# Patient Record
Sex: FEMALE | Race: WHITE | Employment: OTHER | URBAN - NONMETROPOLITAN AREA
[De-identification: names, ages, dates, MRNs, and addresses within clinical notes are randomized per-mention and may not be internally consistent; named-entity substitution may affect disease eponyms.]

---

## 2017-05-17 ENCOUNTER — TELEPHONE (OUTPATIENT)
Dept: FAMILY MEDICINE CLINIC | Age: 82
End: 2017-05-17

## 2017-05-17 DIAGNOSIS — R79.89 ELEVATED D-DIMER: Primary | ICD-10-CM

## 2017-05-17 LAB
BNP INTERPRETATION: NORMAL
D DIMER: 783 NG/ML
PRO-BNP: 98 PG/ML
TROPONIN INTERP: NORMAL
TROPONIN T: <0.03 NG/ML

## 2017-05-23 VITALS
HEIGHT: 64 IN | WEIGHT: 143 LBS | DIASTOLIC BLOOD PRESSURE: 80 MMHG | HEART RATE: 88 BPM | BODY MASS INDEX: 24.41 KG/M2 | SYSTOLIC BLOOD PRESSURE: 124 MMHG

## 2017-05-23 DIAGNOSIS — I10 UNSPECIFIED ESSENTIAL HYPERTENSION: ICD-10-CM

## 2017-05-23 DIAGNOSIS — F41.9 ANXIETY DISORDER, UNSPECIFIED TYPE: ICD-10-CM

## 2017-05-23 PROBLEM — K21.9 GASTROESOPHAGEAL REFLUX DISEASE: Status: ACTIVE | Noted: 2017-05-23

## 2017-05-23 PROBLEM — I73.00 RAYNAUD PHENOMENON: Status: ACTIVE | Noted: 2017-05-23

## 2017-05-23 PROBLEM — I50.30 DIASTOLIC HF (HEART FAILURE) (HCC): Status: ACTIVE | Noted: 2017-05-23

## 2017-05-23 PROBLEM — I25.10 CORONARY ARTERY DISEASE: Status: ACTIVE | Noted: 2017-05-23

## 2017-05-23 RX ORDER — BENZOCAINE/MENTHOL 6 MG-10 MG
LOZENGE MUCOUS MEMBRANE 2 TIMES DAILY
COMMUNITY
End: 2017-09-13 | Stop reason: ALTCHOICE

## 2017-05-23 RX ORDER — HYDROCHLOROTHIAZIDE 12.5 MG/1
12.5 TABLET ORAL DAILY
COMMUNITY
End: 2017-05-25 | Stop reason: SINTOL

## 2017-05-23 RX ORDER — FUROSEMIDE 20 MG/1
20 TABLET ORAL DAILY
COMMUNITY
End: 2018-05-31 | Stop reason: SDUPTHER

## 2017-05-23 RX ORDER — NITROGLYCERIN 0.4 MG/1
0.4 TABLET SUBLINGUAL EVERY 5 MIN PRN
COMMUNITY
End: 2019-02-08 | Stop reason: SDUPTHER

## 2017-05-25 ENCOUNTER — OFFICE VISIT (OUTPATIENT)
Dept: FAMILY MEDICINE CLINIC | Age: 82
End: 2017-05-25
Payer: MEDICARE

## 2017-05-25 VITALS
HEIGHT: 64 IN | BODY MASS INDEX: 24.24 KG/M2 | WEIGHT: 142 LBS | HEART RATE: 80 BPM | DIASTOLIC BLOOD PRESSURE: 80 MMHG | SYSTOLIC BLOOD PRESSURE: 130 MMHG

## 2017-05-25 DIAGNOSIS — K21.9 GASTROESOPHAGEAL REFLUX DISEASE, ESOPHAGITIS PRESENCE NOT SPECIFIED: ICD-10-CM

## 2017-05-25 DIAGNOSIS — I50.32 CHRONIC DIASTOLIC HEART FAILURE (HCC): ICD-10-CM

## 2017-05-25 DIAGNOSIS — J84.10 PULMONARY INTERSTITIAL FIBROSIS (HCC): Primary | ICD-10-CM

## 2017-05-25 DIAGNOSIS — I73.00 RAYNAUD'S PHENOMENON WITHOUT GANGRENE: ICD-10-CM

## 2017-05-25 DIAGNOSIS — F41.9 ANXIETY DISORDER, UNSPECIFIED TYPE: ICD-10-CM

## 2017-05-25 DIAGNOSIS — I25.10 CORONARY ARTERY DISEASE INVOLVING NATIVE CORONARY ARTERY OF NATIVE HEART WITHOUT ANGINA PECTORIS: ICD-10-CM

## 2017-05-25 DIAGNOSIS — I10 UNSPECIFIED ESSENTIAL HYPERTENSION: ICD-10-CM

## 2017-05-25 PROCEDURE — 99214 OFFICE O/P EST MOD 30 MIN: CPT | Performed by: FAMILY MEDICINE

## 2017-05-25 ASSESSMENT — PATIENT HEALTH QUESTIONNAIRE - PHQ9
1. LITTLE INTEREST OR PLEASURE IN DOING THINGS: 0
SUM OF ALL RESPONSES TO PHQ QUESTIONS 1-9: 0
2. FEELING DOWN, DEPRESSED OR HOPELESS: 0
SUM OF ALL RESPONSES TO PHQ9 QUESTIONS 1 & 2: 0

## 2017-05-26 ASSESSMENT — ENCOUNTER SYMPTOMS
CONSTIPATION: 0
DIARRHEA: 0
SHORTNESS OF BREATH: 1
ABDOMINAL PAIN: 0
SORE THROAT: 0
BLOOD IN STOOL: 0
WHEEZING: 0

## 2017-06-01 DIAGNOSIS — J84.10 PULMONARY INTERSTITIAL FIBROSIS (HCC): ICD-10-CM

## 2017-07-20 DIAGNOSIS — J84.10 PULMONARY FIBROSIS (HCC): Primary | ICD-10-CM

## 2017-09-13 ENCOUNTER — OFFICE VISIT (OUTPATIENT)
Dept: OTOLARYNGOLOGY | Age: 82
End: 2017-09-13
Payer: MEDICARE

## 2017-09-13 VITALS
BODY MASS INDEX: 22.18 KG/M2 | DIASTOLIC BLOOD PRESSURE: 80 MMHG | HEIGHT: 66 IN | RESPIRATION RATE: 12 BRPM | HEART RATE: 60 BPM | SYSTOLIC BLOOD PRESSURE: 138 MMHG | WEIGHT: 138 LBS

## 2017-09-13 DIAGNOSIS — H91.90 HEARING LOSS, UNSPECIFIED HEARING LOSS TYPE, UNSPECIFIED LATERALITY: ICD-10-CM

## 2017-09-13 DIAGNOSIS — H61.21 CERUMEN DEBRIS ON TYMPANIC MEMBRANE, RIGHT: Primary | ICD-10-CM

## 2017-09-13 PROCEDURE — 99203 OFFICE O/P NEW LOW 30 MIN: CPT | Performed by: OTOLARYNGOLOGY

## 2017-09-13 RX ORDER — CARVEDILOL 3.12 MG/1
3.12 TABLET ORAL 2 TIMES DAILY WITH MEALS
Refills: 0 | COMMUNITY
Start: 2017-06-16 | End: 2018-02-28 | Stop reason: SDUPTHER

## 2017-10-02 ENCOUNTER — OFFICE VISIT (OUTPATIENT)
Dept: FAMILY MEDICINE CLINIC | Age: 82
End: 2017-10-02
Payer: MEDICARE

## 2017-10-02 VITALS
DIASTOLIC BLOOD PRESSURE: 80 MMHG | WEIGHT: 137 LBS | SYSTOLIC BLOOD PRESSURE: 134 MMHG | BODY MASS INDEX: 22.02 KG/M2 | HEIGHT: 66 IN | HEART RATE: 80 BPM

## 2017-10-02 DIAGNOSIS — R10.30 LOWER ABDOMINAL PAIN: ICD-10-CM

## 2017-10-02 DIAGNOSIS — F41.9 ANXIETY DISORDER, UNSPECIFIED TYPE: Primary | ICD-10-CM

## 2017-10-02 DIAGNOSIS — Z14.8 ALPHA-1-ANTITRYPSIN DEFICIENCY CARRIER: ICD-10-CM

## 2017-10-02 DIAGNOSIS — I50.32 CHRONIC DIASTOLIC HEART FAILURE (HCC): ICD-10-CM

## 2017-10-02 DIAGNOSIS — K21.9 GASTROESOPHAGEAL REFLUX DISEASE, ESOPHAGITIS PRESENCE NOT SPECIFIED: ICD-10-CM

## 2017-10-02 DIAGNOSIS — I25.10 CORONARY ARTERY DISEASE INVOLVING NATIVE CORONARY ARTERY OF NATIVE HEART WITHOUT ANGINA PECTORIS: ICD-10-CM

## 2017-10-02 DIAGNOSIS — Z23 NEED FOR PROPHYLACTIC VACCINATION AND INOCULATION AGAINST INFLUENZA: ICD-10-CM

## 2017-10-02 DIAGNOSIS — I10 UNSPECIFIED ESSENTIAL HYPERTENSION: ICD-10-CM

## 2017-10-02 PROCEDURE — G0008 ADMIN INFLUENZA VIRUS VAC: HCPCS | Performed by: FAMILY MEDICINE

## 2017-10-02 PROCEDURE — 90688 IIV4 VACCINE SPLT 0.5 ML IM: CPT | Performed by: FAMILY MEDICINE

## 2017-10-02 PROCEDURE — 99215 OFFICE O/P EST HI 40 MIN: CPT | Performed by: FAMILY MEDICINE

## 2017-10-02 ASSESSMENT — ENCOUNTER SYMPTOMS
CONSTIPATION: 0
WHEEZING: 0
SORE THROAT: 0
ABDOMINAL PAIN: 0
DIARRHEA: 0
BLOOD IN STOOL: 0

## 2017-10-02 NOTE — PROGRESS NOTES
normal  No fever, chills, heartburn, dysphagia   Today she is feeling better  CHF  Thought to be diastolic in nature  Diuretics   []   none Only uses furosemide when she thinks she needs to    Weight change   []   none    Chest pain   [x]   none    Lower extremity edema   []   none Minimal    Orthopnea/PND   [x]   none    Cardiologist    []   none Last visit 7/2016   Echocardiogram   []   none    Stress test    []   none     Lipids    []   none         BP Readings from Last 3 Encounters:   10/02/17 134/80   09/13/17 138/80   05/25/17 130/80            (goal 120/80)    Past Medical History:   Diagnosis Date    Anxiety     GERD (gastroesophageal reflux disease)     H/O cardiac catheterization 07/2010    Hearing loss     deaf in R ear from treatment for Meniere's disease    Hypertension       Past Surgical History:   Procedure Laterality Date    APPENDECTOMY      BREAST BIOPSY Right 07/2005    Dr Yari Layton  2010    30% RCA stenosis    CHOLECYSTECTOMY      [place]    LAPAROTOMY      MALIGNANT SKIN LESION EXCISION Left 2014    squamous cell carcinoma left 3rd toe, done in office     Family History   Problem Relation Age of Onset    Coronary Art Dis Father     Cancer Sister      ovarian    Coronary Art Dis Brother      Social History   Substance Use Topics    Smoking status: Never Smoker    Smokeless tobacco: Not on file    Alcohol use No        Current Outpatient Prescriptions   Medication Sig Dispense Refill    carvedilol (COREG) 3.125 MG tablet 3.125 mg 2 times daily (with meals)   0    Omega-3 Fatty Acids (FISH OIL PO) Take by mouth      Diphenhydramine-APAP, sleep, (TYLENOL PM EXTRA STRENGTH PO) Take 1 tablet by mouth nightly      nitroGLYCERIN (NITROSTAT) 0.4 MG SL tablet Place 0.4 mg under the tongue every 5 minutes as needed for Chest pain up to max of 3 total doses. If no relief after 1 dose, call 911.       artificial tears 10/2/2018    INFLUENZA, QUADV, 3 YRS AND OLDER, IM, MDV, 0.5ML (FLUZONE QUADV)    Lipid Panel     Standing Status:   Future     Standing Expiration Date:   10/2/2018     Order Specific Question:   Is Patient Fasting?/# of Hours     Answer:   Yes, 12 hours    Comprehensive Metabolic Panel, Fasting     Standing Status:   Future     Standing Expiration Date:   10/2/2018    CBC Auto Differential     Standing Status:   Future     Standing Expiration Date:   10/2/2018     Prescriptions:    No orders of the defined types were placed in this encounter. No Follow-up on file. Electronically signed by Jonell Angelucci, MD on 10/3/2017.

## 2017-10-02 NOTE — MR AVS SNAPSHOT
After Visit Summary             Lori Garcia   10/2/2017 1:45 PM   Office Visit    Description:  Female : 1929   Provider:  Héctor Aguila MD   Department:  [facility]              Your Follow-Up and Future Appointments         Below is a list of your follow-up and future appointments. This may not be a complete list as you may have made appointments directly with providers that we are not aware of or your providers may have made some for you. Please call your providers to confirm appointments. It is important to keep your appointments. Please bring your current insurance card, photo ID, co-pay, and all medication bottles to your appointment. If self-pay, payment is expected at the time of service. Your To-Do List     Future Appointments Provider Department Dept Phone    10/5/2017 1:00 PM Samuel Barber MD [facility] [phone]    Please arrive 15 minutes prior to appointment time, bring insurance card and photo ID.     2018 1:15 PM Héctor Aguila MD [address] [place] [address] [phone]    Please arrive 15 minutes prior to appointment, bring photo ID and insurance card. Future Orders Complete By Expires    XR ACUTE ABD SERIES CHEST 1 VW [64890 Custom]  10/2/2017 10/2/2018    CBC Auto Differential [[id number] Custom]  2018 10/2/2018    Comprehensive Metabolic Panel, Fasting [[id number] Custom]  2018 (Approximate) 10/2/2018    Lipid Panel Ce Reyes Custom]  2018 10/2/2018         Information from Your Visit        Department     Name Address Phone Fax    [address] [facility] [address] [facility] [place] [address] [phone]      You Were Seen for:         Comments    Need for prophylactic vaccination and inoculation against influenza   [V04.81. ICD-9-CM]         Vital Signs     Blood Pressure Pulse Height Weight Body Mass Index Smoking Status

## 2017-10-03 PROBLEM — Z14.8 ALPHA-1-ANTITRYPSIN DEFICIENCY CARRIER: Status: ACTIVE | Noted: 2017-10-03

## 2017-10-03 ASSESSMENT — ENCOUNTER SYMPTOMS: SHORTNESS OF BREATH: 1

## 2017-10-05 ENCOUNTER — HOSPITAL ENCOUNTER (OUTPATIENT)
Dept: GENERAL RADIOLOGY | Age: 82
Discharge: HOME | End: 2017-10-05
Payer: MEDICARE

## 2017-10-05 ENCOUNTER — OFFICE VISIT (OUTPATIENT)
Dept: OTOLARYNGOLOGY | Age: 82
End: 2017-10-05
Payer: MEDICARE

## 2017-10-05 VITALS
RESPIRATION RATE: 12 BRPM | HEIGHT: 66 IN | DIASTOLIC BLOOD PRESSURE: 72 MMHG | SYSTOLIC BLOOD PRESSURE: 132 MMHG | WEIGHT: 137 LBS | BODY MASS INDEX: 22.02 KG/M2 | HEART RATE: 72 BPM

## 2017-10-05 DIAGNOSIS — R10.30 LOWER ABDOMINAL PAIN: ICD-10-CM

## 2017-10-05 DIAGNOSIS — H90.3 SENSORINEURAL HEARING LOSS (SNHL) OF BOTH EARS: Primary | ICD-10-CM

## 2017-10-05 PROCEDURE — 74022 RADEX COMPL AQT ABD SERIES: CPT

## 2017-10-05 PROCEDURE — 99213 OFFICE O/P EST LOW 20 MIN: CPT | Performed by: OTOLARYNGOLOGY

## 2017-10-05 NOTE — MR AVS SNAPSHOT
relief after 1 dose, call 911.    furosemide (LASIX) 20 MG tablet Take 20 mg by mouth daily    artificial tears (ARTIFICIAL TEARS) OINT as needed Indications: 1 drop in each eye twice daily    ALPRAZolam (XANAX) 0.5 MG tablet Take 0.5 mg by mouth 2 times daily    aspirin 81 MG chewable tablet Take 81 mg by mouth daily      Allergies              Asa [Aspirin]     Makes ears ring    Hctz [Hydrochlorothiazide] Hives    Lisinopril     intolerant    Sulfa Antibiotics     procardia         Additional Information        Basic Information     Date Of Birth Sex Race Ethnicity Preferred Language    2/9/1929 Female White Non-/Non  English      Problem List as of 10/5/2017  Date Reviewed: 10/5/2017                Alpha-1-antitrypsin deficiency carrier [facility])    Anxiety disorder    Unspecified essential hypertension    Coronary artery disease    Raynaud phenomenon    Gastroesophageal reflux disease    Diastolic HF (heart failure) ([facility] [address]      Immunizations as of 10/5/2017     Name Date    Influenza Vaccine, unspecified formulation 11/15/2016    Influenza, Quadv, 3 Years and older, IM 10/2/2017    Pneumococcal 13-valent Conjugate ([id number]) 9/3/2015    Pneumococcal Polysaccharide ([id number]) 7/25/2004    Zoster 10/1/2012      Preventive Care        Date Due    Tetanus Combination Vaccine (1 - Tdap) 2/9/1948            [employer] Signup           [employer] allows you to send messages to your doctor, view your test results, renew your prescriptions, schedule appointments, view visit notes, and more. How Do I Sign Up? 1. In your Internet browser, go to [email or web address]  2. Click on the Sign Up Now link in the Sign In box. You will see the New Member Sign Up page. 3. Enter your [employer] Access Code exactly as it appears below. You will not need to use this code after youve completed the sign-up process. If you do not sign up before the expiration date, you must request a new code. [facility] Access Code: [email or web address]  Expires: 11/12/2017  2:48 PM    4. Enter your Social Security Number ([id number]) and Date of Birth (mm/dd/yyyy) as indicated and click Submit. You will be taken to the next sign-up page. 5. Create a [facility] ID. This will be your [facility] login ID and cannot be changed, so think of one that is secure and easy to remember. 6. Create a [facility] password. You can change your password at any time. 7. Enter your Password Reset Question and Answer. This can be used at a later time if you forget your password. 8. Enter your e-mail address. You will receive e-mail notification when new information is available in [address]. 9. Click Sign Up. You can now view your medical record. Additional Information  If you have questions, please contact the physician practice where you receive care. Remember, [facility] is NOT to be used for urgent needs. For medical emergencies, dial 911. For questions regarding your [facility] account call [phone]. If you have a clinical question, please call your doctor's office.

## 2018-02-28 DIAGNOSIS — F41.9 ANXIETY: ICD-10-CM

## 2018-02-28 DIAGNOSIS — I10 ESSENTIAL HYPERTENSION: Primary | ICD-10-CM

## 2018-02-28 NOTE — TELEPHONE ENCOUNTER
[facility] is calling to request a refill on the following medication(s):  Requested Prescriptions     Pending Prescriptions Disp Refills    carvedilol (COREG) 3.125 MG tablet 60 tablet 5     Sig: Take 1 tablet by mouth 2 times daily (with meals)    ALPRAZolam (XANAX) 0.5 MG tablet 60 tablet 5     Sig: Take 1 tablet by mouth 2 times daily for 30 days.        Last Visit Date (If Applicable):  73/9/2229    Next Visit Date:    4/2/2018

## 2018-03-01 RX ORDER — ALPRAZOLAM 0.5 MG/1
0.5 TABLET ORAL 2 TIMES DAILY
Qty: 60 TABLET | Refills: 5 | Status: SHIPPED | OUTPATIENT
Start: 2018-03-01 | End: 2018-03-31

## 2018-03-01 RX ORDER — CARVEDILOL 3.12 MG/1
3.12 TABLET ORAL 2 TIMES DAILY WITH MEALS
Qty: 60 TABLET | Refills: 5 | Status: SHIPPED | OUTPATIENT
Start: 2018-03-01 | End: 2018-09-06 | Stop reason: SDUPTHER

## 2018-04-23 ENCOUNTER — HOSPITAL ENCOUNTER (OUTPATIENT)
Dept: LAB | Age: 83
Setting detail: SPECIMEN
Discharge: HOME | End: 2018-04-23
Payer: MEDICARE

## 2018-04-23 DIAGNOSIS — R79.89 ELEVATED D-DIMER: ICD-10-CM

## 2018-04-23 DIAGNOSIS — I10 ESSENTIAL HYPERTENSION: ICD-10-CM

## 2018-04-23 LAB
ABSOLUTE EOS #: 0.2 K/UL (ref 0–0.4)
ABSOLUTE IMMATURE GRANULOCYTE: ABNORMAL K/UL (ref 0–0.3)
ABSOLUTE LYMPH #: 2.4 K/UL (ref 1–4.8)
ABSOLUTE MONO #: 0.5 K/UL (ref 0.1–1.2)
ALBUMIN SERPL-MCNC: 4 G/DL (ref 3.5–5.2)
ALBUMIN/GLOBULIN RATIO: 1.1 (ref 1–2.5)
ALP BLD-CCNC: 67 U/L (ref 35–104)
ALT SERPL-CCNC: 10 U/L (ref 5–33)
ANION GAP SERPL CALCULATED.3IONS-SCNC: 12 MMOL/L (ref 9–17)
AST SERPL-CCNC: 20 U/L
BASOPHILS # BLD: 2 % (ref 0–1)
BASOPHILS ABSOLUTE: 0.1 K/UL (ref 0–0.2)
BILIRUB SERPL-MCNC: 0.63 MG/DL (ref 0.3–1.2)
BUN BLDV-MCNC: 19 MG/DL (ref 8–23)
BUN/CREAT BLD: 26 (ref 9–20)
CALCIUM SERPL-MCNC: 9.6 MG/DL (ref 8.6–10.4)
CHLORIDE BLD-SCNC: 103 MMOL/L (ref 98–107)
CHOLESTEROL/HDL RATIO: 2.8
CHOLESTEROL: 174 MG/DL
CO2: 27 MMOL/L (ref 20–31)
CREAT SERPL-MCNC: 0.73 MG/DL (ref 0.5–0.9)
DIFFERENTIAL TYPE: ABNORMAL
EOSINOPHILS RELATIVE PERCENT: 4 % (ref 1–7)
GFR AFRICAN AMERICAN: >60 ML/MIN
GFR NON-AFRICAN AMERICAN: >60 ML/MIN
GFR SERPL CREATININE-BSD FRML MDRD: ABNORMAL ML/MIN/{1.73_M2}
GFR SERPL CREATININE-BSD FRML MDRD: ABNORMAL ML/MIN/{1.73_M2}
GLUCOSE FASTING: 134 MG/DL (ref 70–99)
HCT VFR BLD CALC: 37.4 % (ref 36–46)
HDLC SERPL-MCNC: 62 MG/DL
HEMOGLOBIN: 12.5 G/DL (ref 12–16)
IMMATURE GRANULOCYTES: ABNORMAL %
LDL CHOLESTEROL: 93 MG/DL (ref 0–130)
LYMPHOCYTES # BLD: 39 % (ref 16–46)
MCH RBC QN AUTO: 33.9 PG (ref 26–34)
MCHC RBC AUTO-ENTMCNC: 33.5 G/DL (ref 31–37)
MCV RBC AUTO: 101.1 FL (ref 80–100)
MONOCYTES # BLD: 8 % (ref 4–11)
NRBC AUTOMATED: ABNORMAL PER 100 WBC
PDW BLD-RTO: 13.1 % (ref 11–14.5)
PLATELET # BLD: 223 K/UL (ref 140–450)
PLATELET ESTIMATE: ABNORMAL
PMV BLD AUTO: 9 FL (ref 6–12)
POTASSIUM SERPL-SCNC: 3.8 MMOL/L (ref 3.7–5.3)
RBC # BLD: 3.7 M/UL (ref 4–5.2)
RBC # BLD: ABNORMAL 10*6/UL
SEG NEUTROPHILS: 47 % (ref 43–77)
SEGMENTED NEUTROPHILS ABSOLUTE COUNT: 2.9 K/UL (ref 1.8–7.7)
SODIUM BLD-SCNC: 142 MMOL/L (ref 135–144)
TOTAL PROTEIN: 7.6 G/DL (ref 6.4–8.3)
TRIGL SERPL-MCNC: 95 MG/DL
VLDLC SERPL CALC-MCNC: NORMAL MG/DL (ref 1–30)
WBC # BLD: 6.2 K/UL (ref 3.5–11)
WBC # BLD: ABNORMAL 10*3/UL

## 2018-04-23 PROCEDURE — 85025 COMPLETE CBC W/AUTO DIFF WBC: CPT

## 2018-04-23 PROCEDURE — 80061 LIPID PANEL: CPT

## 2018-04-23 PROCEDURE — 36415 COLL VENOUS BLD VENIPUNCTURE: CPT

## 2018-04-23 PROCEDURE — 80053 COMPREHEN METABOLIC PANEL: CPT

## 2018-05-31 ENCOUNTER — OFFICE VISIT (OUTPATIENT)
Dept: FAMILY MEDICINE CLINIC | Age: 83
End: 2018-05-31
Payer: MEDICARE

## 2018-05-31 VITALS
WEIGHT: 137 LBS | HEART RATE: 60 BPM | DIASTOLIC BLOOD PRESSURE: 80 MMHG | SYSTOLIC BLOOD PRESSURE: 130 MMHG | BODY MASS INDEX: 22.11 KG/M2

## 2018-05-31 DIAGNOSIS — I50.32 CHRONIC DIASTOLIC HEART FAILURE (HCC): ICD-10-CM

## 2018-05-31 DIAGNOSIS — I10 ESSENTIAL HYPERTENSION: Primary | ICD-10-CM

## 2018-05-31 DIAGNOSIS — Z14.8 ALPHA-1-ANTITRYPSIN DEFICIENCY CARRIER: ICD-10-CM

## 2018-05-31 DIAGNOSIS — I25.10 CORONARY ARTERY DISEASE INVOLVING NATIVE CORONARY ARTERY OF NATIVE HEART WITHOUT ANGINA PECTORIS: ICD-10-CM

## 2018-05-31 DIAGNOSIS — F41.9 ANXIETY: ICD-10-CM

## 2018-05-31 DIAGNOSIS — J84.10 PULMONARY INTERSTITIAL FIBROSIS (HCC): ICD-10-CM

## 2018-05-31 PROCEDURE — 99214 OFFICE O/P EST MOD 30 MIN: CPT | Performed by: FAMILY MEDICINE

## 2018-05-31 RX ORDER — FUROSEMIDE 20 MG/1
TABLET ORAL
Qty: 60 TABLET | Refills: 1 | Status: SHIPPED | OUTPATIENT
Start: 2018-05-31 | End: 2019-11-15 | Stop reason: SDUPTHER

## 2018-05-31 RX ORDER — ALPRAZOLAM 0.5 MG/1
0.5 TABLET ORAL 2 TIMES DAILY
COMMUNITY
End: 2018-09-06 | Stop reason: SDUPTHER

## 2018-05-31 RX ORDER — POTASSIUM CHLORIDE 750 MG/1
TABLET, EXTENDED RELEASE ORAL
Qty: 60 TABLET | Refills: 0 | Status: SHIPPED | OUTPATIENT
Start: 2018-05-31 | End: 2019-11-15 | Stop reason: SDUPTHER

## 2018-05-31 ASSESSMENT — PATIENT HEALTH QUESTIONNAIRE - PHQ9
2. FEELING DOWN, DEPRESSED OR HOPELESS: 0
SUM OF ALL RESPONSES TO PHQ9 QUESTIONS 1 & 2: 0
SUM OF ALL RESPONSES TO PHQ QUESTIONS 1-9: 0
1. LITTLE INTEREST OR PLEASURE IN DOING THINGS: 0

## 2018-06-05 ASSESSMENT — ENCOUNTER SYMPTOMS
COUGH: 0
WHEEZING: 0
BLOOD IN STOOL: 0
ABDOMINAL PAIN: 0
SORE THROAT: 0
SHORTNESS OF BREATH: 1

## 2018-09-06 ENCOUNTER — OFFICE VISIT (OUTPATIENT)
Dept: PRIMARY CARE CLINIC | Age: 83
End: 2018-09-06
Payer: MEDICARE

## 2018-09-06 VITALS
TEMPERATURE: 97.9 F | DIASTOLIC BLOOD PRESSURE: 70 MMHG | OXYGEN SATURATION: 97 % | HEIGHT: 66 IN | RESPIRATION RATE: 16 BRPM | WEIGHT: 136 LBS | BODY MASS INDEX: 21.86 KG/M2 | SYSTOLIC BLOOD PRESSURE: 138 MMHG | HEART RATE: 86 BPM

## 2018-09-06 DIAGNOSIS — F41.9 ANXIETY: Primary | ICD-10-CM

## 2018-09-06 DIAGNOSIS — H00.011 HORDEOLUM EXTERNUM OF RIGHT UPPER EYELID: Primary | ICD-10-CM

## 2018-09-06 DIAGNOSIS — I10 ESSENTIAL HYPERTENSION: ICD-10-CM

## 2018-09-06 PROCEDURE — 99213 OFFICE O/P EST LOW 20 MIN: CPT | Performed by: FAMILY MEDICINE

## 2018-09-06 ASSESSMENT — ENCOUNTER SYMPTOMS
EYE PAIN: 0
EYES NEGATIVE: 1
RESPIRATORY NEGATIVE: 1
GASTROINTESTINAL NEGATIVE: 1
EYE REDNESS: 0
ALLERGIC/IMMUNOLOGIC NEGATIVE: 1
EYE DISCHARGE: 0
COLOR CHANGE: 1

## 2018-09-06 ASSESSMENT — PATIENT HEALTH QUESTIONNAIRE - PHQ9
SUM OF ALL RESPONSES TO PHQ9 QUESTIONS 1 & 2: 0
SUM OF ALL RESPONSES TO PHQ QUESTIONS 1-9: 0
SUM OF ALL RESPONSES TO PHQ QUESTIONS 1-9: 0
2. FEELING DOWN, DEPRESSED OR HOPELESS: 0
1. LITTLE INTEREST OR PLEASURE IN DOING THINGS: 0

## 2018-09-06 NOTE — TELEPHONE ENCOUNTER
[facility] is calling to request a refill on the following medication(s):  Requested Prescriptions     Pending Prescriptions Disp Refills    ALPRAZolam (XANAX) 0.5 MG tablet       Sig: Take 1 tablet by mouth 2 times daily. Betancourt Leather carvedilol (COREG) 3.125 MG tablet 60 tablet 5     Sig: Take 1 tablet by mouth 2 times daily (with meals)       Last Visit Date (If Applicable):  3/66/9374    Next Visit Date:    11/29/2018

## 2018-09-07 RX ORDER — ALPRAZOLAM 0.5 MG/1
0.5 TABLET ORAL 2 TIMES DAILY
Qty: 60 TABLET | Refills: 0 | Status: SHIPPED | OUTPATIENT
Start: 2018-09-07 | End: 2018-10-07

## 2018-09-07 RX ORDER — CARVEDILOL 3.12 MG/1
3.12 TABLET ORAL 2 TIMES DAILY WITH MEALS
Qty: 60 TABLET | Refills: 5 | Status: SHIPPED | OUTPATIENT
Start: 2018-09-07 | End: 2019-02-08 | Stop reason: SDUPTHER

## 2019-02-06 ENCOUNTER — APPOINTMENT (OUTPATIENT)
Dept: CT IMAGING | Age: 84
End: 2019-02-06
Payer: MEDICARE

## 2019-02-06 ENCOUNTER — HOSPITAL ENCOUNTER (EMERGENCY)
Age: 84
Discharge: HOME | End: 2019-02-06
Attending: EMERGENCY MEDICINE
Payer: MEDICARE

## 2019-02-06 ENCOUNTER — APPOINTMENT (OUTPATIENT)
Dept: GENERAL RADIOLOGY | Age: 84
End: 2019-02-06
Payer: MEDICARE

## 2019-02-06 VITALS
TEMPERATURE: 98.2 F | DIASTOLIC BLOOD PRESSURE: 67 MMHG | SYSTOLIC BLOOD PRESSURE: 149 MMHG | RESPIRATION RATE: 14 BRPM | HEART RATE: 70 BPM | OXYGEN SATURATION: 98 % | HEIGHT: 66 IN | WEIGHT: 138 LBS | BODY MASS INDEX: 22.18 KG/M2

## 2019-02-06 DIAGNOSIS — R00.2 PALPITATIONS: Primary | ICD-10-CM

## 2019-02-06 DIAGNOSIS — I10 CHRONIC HYPERTENSION: ICD-10-CM

## 2019-02-06 DIAGNOSIS — F41.1 ANXIETY STATE: ICD-10-CM

## 2019-02-06 LAB
ANION GAP SERPL CALCULATED.3IONS-SCNC: 12 MMOL/L (ref 9–17)
BNP INTERPRETATION: NORMAL
BUN BLDV-MCNC: 16 MG/DL (ref 8–23)
BUN/CREAT BLD: 20 (ref 9–20)
CALCIUM SERPL-MCNC: 9.2 MG/DL (ref 8.6–10.4)
CHLORIDE BLD-SCNC: 102 MMOL/L (ref 98–107)
CO2: 25 MMOL/L (ref 20–31)
CREAT SERPL-MCNC: 0.79 MG/DL (ref 0.5–0.9)
D DIMER: 780 NG/ML
GFR AFRICAN AMERICAN: >60 ML/MIN
GFR NON-AFRICAN AMERICAN: >60 ML/MIN
GFR SERPL CREATININE-BSD FRML MDRD: ABNORMAL ML/MIN/{1.73_M2}
GFR SERPL CREATININE-BSD FRML MDRD: ABNORMAL ML/MIN/{1.73_M2}
GLUCOSE BLD-MCNC: 125 MG/DL (ref 70–99)
HCT VFR BLD CALC: 39.6 % (ref 36–46)
HEMOGLOBIN: 13 G/DL (ref 12–16)
INR BLD: 1.1
MAGNESIUM: 1.9 MG/DL (ref 1.6–2.6)
MCH RBC QN AUTO: 33.8 PG (ref 26–34)
MCHC RBC AUTO-ENTMCNC: 32.8 G/DL (ref 31–37)
MCV RBC AUTO: 102.8 FL (ref 80–100)
NRBC AUTOMATED: ABNORMAL PER 100 WBC
PARTIAL THROMBOPLASTIN TIME: 27.3 SEC (ref 27–35)
PDW BLD-RTO: 13.3 % (ref 11–14.5)
PLATELET # BLD: 209 K/UL (ref 140–450)
PMV BLD AUTO: 9.3 FL (ref 6–12)
POTASSIUM SERPL-SCNC: 3.9 MMOL/L (ref 3.7–5.3)
PRO-BNP: 173 PG/ML
PROTHROMBIN TIME: 11.7 SEC (ref 9.4–11.3)
RBC # BLD: 3.85 M/UL (ref 4–5.2)
SODIUM BLD-SCNC: 139 MMOL/L (ref 135–144)
TROPONIN INTERP: NORMAL
TROPONIN T: NORMAL NG/ML
TROPONIN, HIGH SENSITIVITY: 10 NG/L (ref 0–14)
WBC # BLD: 5.5 K/UL (ref 3.5–11)

## 2019-02-06 PROCEDURE — 85610 PROTHROMBIN TIME: CPT

## 2019-02-06 PROCEDURE — 85027 COMPLETE CBC AUTOMATED: CPT

## 2019-02-06 PROCEDURE — 96374 THER/PROPH/DIAG INJ IV PUSH: CPT

## 2019-02-06 PROCEDURE — 6360000002 HC RX W HCPCS: Performed by: EMERGENCY MEDICINE

## 2019-02-06 PROCEDURE — 36415 COLL VENOUS BLD VENIPUNCTURE: CPT

## 2019-02-06 PROCEDURE — 2709999900 CT CHEST PULMONARY EMBOLISM W CONTRAST

## 2019-02-06 PROCEDURE — 99285 EMERGENCY DEPT VISIT HI MDM: CPT

## 2019-02-06 PROCEDURE — 71045 X-RAY EXAM CHEST 1 VIEW: CPT

## 2019-02-06 PROCEDURE — 85379 FIBRIN DEGRADATION QUANT: CPT

## 2019-02-06 PROCEDURE — 83880 ASSAY OF NATRIURETIC PEPTIDE: CPT

## 2019-02-06 PROCEDURE — 6360000004 HC RX CONTRAST MEDICATION: Performed by: EMERGENCY MEDICINE

## 2019-02-06 PROCEDURE — 84484 ASSAY OF TROPONIN QUANT: CPT

## 2019-02-06 PROCEDURE — 93005 ELECTROCARDIOGRAM TRACING: CPT

## 2019-02-06 PROCEDURE — 80048 BASIC METABOLIC PNL TOTAL CA: CPT

## 2019-02-06 PROCEDURE — 83735 ASSAY OF MAGNESIUM: CPT

## 2019-02-06 PROCEDURE — 85730 THROMBOPLASTIN TIME PARTIAL: CPT

## 2019-02-06 RX ORDER — LORAZEPAM 2 MG/ML
1 INJECTION INTRAMUSCULAR ONCE
Status: COMPLETED | OUTPATIENT
Start: 2019-02-06 | End: 2019-02-06

## 2019-02-06 RX ADMIN — LORAZEPAM 1 MG: 2 INJECTION INTRAMUSCULAR; INTRAVENOUS at 11:24

## 2019-02-06 RX ADMIN — IOPAMIDOL 80 ML: 755 INJECTION, SOLUTION INTRAVENOUS at 12:45

## 2019-02-06 ASSESSMENT — ENCOUNTER SYMPTOMS
COUGH: 0
ABDOMINAL PAIN: 0
SHORTNESS OF BREATH: 1
VOMITING: 0

## 2019-02-08 ENCOUNTER — OFFICE VISIT (OUTPATIENT)
Dept: FAMILY MEDICINE CLINIC | Age: 84
End: 2019-02-08
Payer: MEDICARE

## 2019-02-08 VITALS
OXYGEN SATURATION: 98 % | SYSTOLIC BLOOD PRESSURE: 130 MMHG | HEART RATE: 80 BPM | BODY MASS INDEX: 22.37 KG/M2 | DIASTOLIC BLOOD PRESSURE: 74 MMHG | WEIGHT: 138.6 LBS

## 2019-02-08 DIAGNOSIS — I10 ESSENTIAL HYPERTENSION: Primary | ICD-10-CM

## 2019-02-08 DIAGNOSIS — I25.10 CORONARY ARTERY DISEASE INVOLVING NATIVE CORONARY ARTERY OF NATIVE HEART WITHOUT ANGINA PECTORIS: ICD-10-CM

## 2019-02-08 DIAGNOSIS — I50.32 CHRONIC DIASTOLIC HEART FAILURE (HCC): ICD-10-CM

## 2019-02-08 DIAGNOSIS — Z14.8 ALPHA-1-ANTITRYPSIN DEFICIENCY CARRIER: ICD-10-CM

## 2019-02-08 DIAGNOSIS — F41.9 ANXIETY DISORDER, UNSPECIFIED TYPE: ICD-10-CM

## 2019-02-08 LAB
EKG ATRIAL RATE: 73 BPM
EKG P AXIS: 75 DEGREES
EKG P-R INTERVAL: 202 MS
EKG Q-T INTERVAL: 370 MS
EKG QRS DURATION: 80 MS
EKG QTC CALCULATION (BAZETT): 407 MS
EKG R AXIS: 17 DEGREES
EKG T AXIS: 59 DEGREES
EKG VENTRICULAR RATE: 73 BPM

## 2019-02-08 PROCEDURE — 1036F TOBACCO NON-USER: CPT | Performed by: FAMILY MEDICINE

## 2019-02-08 PROCEDURE — G8598 ASA/ANTIPLAT THER USED: HCPCS | Performed by: FAMILY MEDICINE

## 2019-02-08 PROCEDURE — 1123F ACP DISCUSS/DSCN MKR DOCD: CPT | Performed by: FAMILY MEDICINE

## 2019-02-08 PROCEDURE — 1101F PT FALLS ASSESS-DOCD LE1/YR: CPT | Performed by: FAMILY MEDICINE

## 2019-02-08 PROCEDURE — 4040F PNEUMOC VAC/ADMIN/RCVD: CPT | Performed by: FAMILY MEDICINE

## 2019-02-08 PROCEDURE — 99214 OFFICE O/P EST MOD 30 MIN: CPT | Performed by: FAMILY MEDICINE

## 2019-02-08 PROCEDURE — G8420 CALC BMI NORM PARAMETERS: HCPCS | Performed by: FAMILY MEDICINE

## 2019-02-08 PROCEDURE — G8427 DOCREV CUR MEDS BY ELIG CLIN: HCPCS | Performed by: FAMILY MEDICINE

## 2019-02-08 PROCEDURE — 1090F PRES/ABSN URINE INCON ASSESS: CPT | Performed by: FAMILY MEDICINE

## 2019-02-08 PROCEDURE — G8482 FLU IMMUNIZE ORDER/ADMIN: HCPCS | Performed by: FAMILY MEDICINE

## 2019-02-08 RX ORDER — ALPRAZOLAM 0.5 MG/1
0.5 TABLET ORAL NIGHTLY PRN
COMMUNITY
End: 2019-02-08 | Stop reason: DRUGHIGH

## 2019-02-08 RX ORDER — CARVEDILOL 3.12 MG/1
3.12 TABLET ORAL 2 TIMES DAILY WITH MEALS
Qty: 180 TABLET | Refills: 3 | Status: SHIPPED | OUTPATIENT
Start: 2019-02-08 | End: 2019-11-15 | Stop reason: SDUPTHER

## 2019-02-08 RX ORDER — ALPRAZOLAM 0.5 MG/1
TABLET ORAL
Qty: 60 TABLET | Refills: 5 | Status: SHIPPED | OUTPATIENT
Start: 2019-02-08 | End: 2019-11-04 | Stop reason: SDUPTHER

## 2019-02-08 RX ORDER — NITROGLYCERIN 0.4 MG/1
0.4 TABLET SUBLINGUAL EVERY 5 MIN PRN
Qty: 25 TABLET | Refills: 1 | Status: SHIPPED | OUTPATIENT
Start: 2019-02-08

## 2019-02-08 RX ORDER — CARVEDILOL 3.12 MG/1
3.12 TABLET ORAL 2 TIMES DAILY WITH MEALS
Qty: 60 TABLET | Refills: 11 | Status: SHIPPED | OUTPATIENT
Start: 2019-02-08 | End: 2019-02-08 | Stop reason: SDUPTHER

## 2019-02-08 ASSESSMENT — PATIENT HEALTH QUESTIONNAIRE - PHQ9: DEPRESSION UNABLE TO ASSESS: PT REFUSES

## 2019-02-14 ASSESSMENT — ENCOUNTER SYMPTOMS
WHEEZING: 0
BLOOD IN STOOL: 0
BACK PAIN: 0
SORE THROAT: 0
SHORTNESS OF BREATH: 1
COUGH: 0
ABDOMINAL PAIN: 0

## 2019-11-15 ENCOUNTER — HOSPITAL ENCOUNTER (OUTPATIENT)
Age: 84
Setting detail: SPECIMEN
Discharge: HOME | End: 2019-11-15
Payer: MEDICARE

## 2019-11-15 ENCOUNTER — OFFICE VISIT (OUTPATIENT)
Dept: FAMILY MEDICINE CLINIC | Age: 84
End: 2019-11-15
Payer: MEDICARE

## 2019-11-15 VITALS — SYSTOLIC BLOOD PRESSURE: 132 MMHG | DIASTOLIC BLOOD PRESSURE: 70 MMHG | HEART RATE: 83 BPM | OXYGEN SATURATION: 96 %

## 2019-11-15 DIAGNOSIS — I10 ESSENTIAL HYPERTENSION: ICD-10-CM

## 2019-11-15 DIAGNOSIS — F41.9 ANXIETY DISORDER, UNSPECIFIED TYPE: Primary | ICD-10-CM

## 2019-11-15 DIAGNOSIS — Z14.8 ALPHA-1-ANTITRYPSIN DEFICIENCY CARRIER: ICD-10-CM

## 2019-11-15 DIAGNOSIS — Z23 NEED FOR 23-POLYVALENT PNEUMOCOCCAL POLYSACCHARIDE VACCINE: ICD-10-CM

## 2019-11-15 DIAGNOSIS — I50.32 CHRONIC DIASTOLIC HEART FAILURE (HCC): ICD-10-CM

## 2019-11-15 DIAGNOSIS — I25.10 CORONARY ARTERY DISEASE INVOLVING NATIVE CORONARY ARTERY OF NATIVE HEART WITHOUT ANGINA PECTORIS: ICD-10-CM

## 2019-11-15 LAB
ABSOLUTE EOS #: 0.2 K/UL (ref 0–0.4)
ABSOLUTE IMMATURE GRANULOCYTE: ABNORMAL K/UL (ref 0–0.3)
ABSOLUTE LYMPH #: 2.6 K/UL (ref 1–4.8)
ABSOLUTE MONO #: 0.7 K/UL (ref 0.1–1.2)
ANION GAP SERPL CALCULATED.3IONS-SCNC: 12 MMOL/L (ref 9–17)
BASOPHILS # BLD: 1 % (ref 0–1)
BASOPHILS ABSOLUTE: 0 K/UL (ref 0–0.2)
BUN BLDV-MCNC: 18 MG/DL (ref 8–23)
BUN/CREAT BLD: 23 (ref 9–20)
CALCIUM SERPL-MCNC: 10.1 MG/DL (ref 8.6–10.4)
CHLORIDE BLD-SCNC: 100 MMOL/L (ref 98–107)
CO2: 26 MMOL/L (ref 20–31)
CREAT SERPL-MCNC: 0.77 MG/DL (ref 0.5–0.9)
DIFFERENTIAL TYPE: ABNORMAL
EOSINOPHILS RELATIVE PERCENT: 3 % (ref 1–7)
GFR AFRICAN AMERICAN: >60 ML/MIN
GFR NON-AFRICAN AMERICAN: >60 ML/MIN
GFR SERPL CREATININE-BSD FRML MDRD: ABNORMAL ML/MIN/{1.73_M2}
GFR SERPL CREATININE-BSD FRML MDRD: ABNORMAL ML/MIN/{1.73_M2}
GLUCOSE BLD-MCNC: 106 MG/DL (ref 70–99)
HCT VFR BLD CALC: 37.6 % (ref 36–46)
HEMOGLOBIN: 12.6 G/DL (ref 12–16)
IMMATURE GRANULOCYTES: ABNORMAL %
LYMPHOCYTES # BLD: 35 % (ref 16–46)
MCH RBC QN AUTO: 34.5 PG (ref 26–34)
MCHC RBC AUTO-ENTMCNC: 33.6 G/DL (ref 31–37)
MCV RBC AUTO: 102.8 FL (ref 80–100)
MONOCYTES # BLD: 9 % (ref 4–11)
NRBC AUTOMATED: ABNORMAL PER 100 WBC
PDW BLD-RTO: 13.3 % (ref 11–14.5)
PLATELET # BLD: 256 K/UL (ref 140–450)
PLATELET ESTIMATE: ABNORMAL
PMV BLD AUTO: 9.8 FL (ref 6–12)
POTASSIUM SERPL-SCNC: 4.2 MMOL/L (ref 3.7–5.3)
RBC # BLD: 3.66 M/UL (ref 4–5.2)
RBC # BLD: ABNORMAL 10*6/UL
SEG NEUTROPHILS: 52 % (ref 43–77)
SEGMENTED NEUTROPHILS ABSOLUTE COUNT: 3.9 K/UL (ref 1.8–7.7)
SODIUM BLD-SCNC: 138 MMOL/L (ref 135–144)
WBC # BLD: 7.5 K/UL (ref 3.5–11)
WBC # BLD: ABNORMAL 10*3/UL

## 2019-11-15 PROCEDURE — 1090F PRES/ABSN URINE INCON ASSESS: CPT | Performed by: FAMILY MEDICINE

## 2019-11-15 PROCEDURE — 1036F TOBACCO NON-USER: CPT | Performed by: FAMILY MEDICINE

## 2019-11-15 PROCEDURE — 1123F ACP DISCUSS/DSCN MKR DOCD: CPT | Performed by: FAMILY MEDICINE

## 2019-11-15 PROCEDURE — 4040F PNEUMOC VAC/ADMIN/RCVD: CPT | Performed by: FAMILY MEDICINE

## 2019-11-15 PROCEDURE — G8482 FLU IMMUNIZE ORDER/ADMIN: HCPCS | Performed by: FAMILY MEDICINE

## 2019-11-15 PROCEDURE — G8427 DOCREV CUR MEDS BY ELIG CLIN: HCPCS | Performed by: FAMILY MEDICINE

## 2019-11-15 PROCEDURE — G8420 CALC BMI NORM PARAMETERS: HCPCS | Performed by: FAMILY MEDICINE

## 2019-11-15 PROCEDURE — 90732 PPSV23 VACC 2 YRS+ SUBQ/IM: CPT | Performed by: FAMILY MEDICINE

## 2019-11-15 PROCEDURE — 36415 COLL VENOUS BLD VENIPUNCTURE: CPT

## 2019-11-15 PROCEDURE — 85025 COMPLETE CBC W/AUTO DIFF WBC: CPT

## 2019-11-15 PROCEDURE — G0009 ADMIN PNEUMOCOCCAL VACCINE: HCPCS | Performed by: FAMILY MEDICINE

## 2019-11-15 PROCEDURE — G8598 ASA/ANTIPLAT THER USED: HCPCS | Performed by: FAMILY MEDICINE

## 2019-11-15 PROCEDURE — 99214 OFFICE O/P EST MOD 30 MIN: CPT | Performed by: FAMILY MEDICINE

## 2019-11-15 PROCEDURE — 80048 BASIC METABOLIC PNL TOTAL CA: CPT

## 2019-11-15 RX ORDER — POTASSIUM CHLORIDE 750 MG/1
TABLET, EXTENDED RELEASE ORAL
Qty: 60 TABLET | Refills: 0 | Status: SHIPPED | OUTPATIENT
Start: 2019-11-15

## 2019-11-15 RX ORDER — FUROSEMIDE 20 MG/1
TABLET ORAL
Qty: 60 TABLET | Refills: 1 | Status: SHIPPED | OUTPATIENT
Start: 2019-11-15 | End: 2021-01-29

## 2019-11-15 RX ORDER — CARVEDILOL 3.12 MG/1
3.12 TABLET ORAL 2 TIMES DAILY WITH MEALS
Qty: 180 TABLET | Refills: 3 | Status: SHIPPED | OUTPATIENT
Start: 2019-11-15 | End: 2020-12-04

## 2019-11-15 ASSESSMENT — PATIENT HEALTH QUESTIONNAIRE - PHQ9
SUM OF ALL RESPONSES TO PHQ QUESTIONS 1-9: 0
1. LITTLE INTEREST OR PLEASURE IN DOING THINGS: 0
2. FEELING DOWN, DEPRESSED OR HOPELESS: 0
SUM OF ALL RESPONSES TO PHQ QUESTIONS 1-9: 0
SUM OF ALL RESPONSES TO PHQ9 QUESTIONS 1 & 2: 0

## 2019-11-15 ASSESSMENT — ENCOUNTER SYMPTOMS
BACK PAIN: 0
COUGH: 0
ABDOMINAL PAIN: 0
WHEEZING: 0
SORE THROAT: 0
SHORTNESS OF BREATH: 1
BLOOD IN STOOL: 0

## 2020-06-26 ENCOUNTER — OFFICE VISIT (OUTPATIENT)
Dept: FAMILY MEDICINE CLINIC | Age: 85
End: 2020-06-26
Payer: MEDICARE

## 2020-06-26 VITALS — OXYGEN SATURATION: 98 % | DIASTOLIC BLOOD PRESSURE: 70 MMHG | HEART RATE: 77 BPM | SYSTOLIC BLOOD PRESSURE: 100 MMHG

## 2020-06-26 PROCEDURE — 1036F TOBACCO NON-USER: CPT | Performed by: NURSE PRACTITIONER

## 2020-06-26 PROCEDURE — G8427 DOCREV CUR MEDS BY ELIG CLIN: HCPCS | Performed by: NURSE PRACTITIONER

## 2020-06-26 PROCEDURE — 1090F PRES/ABSN URINE INCON ASSESS: CPT | Performed by: NURSE PRACTITIONER

## 2020-06-26 PROCEDURE — G8421 BMI NOT CALCULATED: HCPCS | Performed by: NURSE PRACTITIONER

## 2020-06-26 PROCEDURE — 4040F PNEUMOC VAC/ADMIN/RCVD: CPT | Performed by: NURSE PRACTITIONER

## 2020-06-26 PROCEDURE — 1123F ACP DISCUSS/DSCN MKR DOCD: CPT | Performed by: NURSE PRACTITIONER

## 2020-06-26 PROCEDURE — 99213 OFFICE O/P EST LOW 20 MIN: CPT

## 2020-06-26 PROCEDURE — 99213 OFFICE O/P EST LOW 20 MIN: CPT | Performed by: NURSE PRACTITIONER

## 2020-06-26 RX ORDER — ALPRAZOLAM 0.5 MG/1
TABLET ORAL
COMMUNITY
Start: 2020-04-09 | End: 2020-06-26 | Stop reason: SDUPTHER

## 2020-06-26 RX ORDER — ALPRAZOLAM 0.5 MG/1
TABLET ORAL
Qty: 30 TABLET | Refills: 2 | Status: SHIPPED | OUTPATIENT
Start: 2020-06-26 | End: 2021-01-29 | Stop reason: SDUPTHER

## 2020-06-26 SDOH — ECONOMIC STABILITY: TRANSPORTATION INSECURITY
IN THE PAST 12 MONTHS, HAS LACK OF TRANSPORTATION KEPT YOU FROM MEETINGS, WORK, OR FROM GETTING THINGS NEEDED FOR DAILY LIVING?: PATIENT DECLINED

## 2020-06-26 SDOH — ECONOMIC STABILITY: TRANSPORTATION INSECURITY
IN THE PAST 12 MONTHS, HAS THE LACK OF TRANSPORTATION KEPT YOU FROM MEDICAL APPOINTMENTS OR FROM GETTING MEDICATIONS?: PATIENT DECLINED

## 2020-06-26 SDOH — ECONOMIC STABILITY: FOOD INSECURITY: WITHIN THE PAST 12 MONTHS, THE FOOD YOU BOUGHT JUST DIDN'T LAST AND YOU DIDN'T HAVE MONEY TO GET MORE.: PATIENT DECLINED

## 2020-06-26 SDOH — ECONOMIC STABILITY: INCOME INSECURITY: HOW HARD IS IT FOR YOU TO PAY FOR THE VERY BASICS LIKE FOOD, HOUSING, MEDICAL CARE, AND HEATING?: PATIENT DECLINED

## 2020-06-26 SDOH — ECONOMIC STABILITY: FOOD INSECURITY: WITHIN THE PAST 12 MONTHS, YOU WORRIED THAT YOUR FOOD WOULD RUN OUT BEFORE YOU GOT MONEY TO BUY MORE.: PATIENT DECLINED

## 2020-06-26 ASSESSMENT — ENCOUNTER SYMPTOMS
COUGH: 0
ABDOMINAL PAIN: 0
WHEEZING: 0
SHORTNESS OF BREATH: 0

## 2020-06-26 ASSESSMENT — PATIENT HEALTH QUESTIONNAIRE - PHQ9
2. FEELING DOWN, DEPRESSED OR HOPELESS: 0
1. LITTLE INTEREST OR PLEASURE IN DOING THINGS: 0
SUM OF ALL RESPONSES TO PHQ9 QUESTIONS 1 & 2: 0
SUM OF ALL RESPONSES TO PHQ QUESTIONS 1-9: 0
SUM OF ALL RESPONSES TO PHQ QUESTIONS 1-9: 0

## 2020-06-26 NOTE — PROGRESS NOTES
[facility]  [address] [place] [address] [place] [address]  Dept: [phone]  Dept Fax: [phone]  Loc: [phone]     Visit Date:  6/26/2020    Patient:  Iman Zapien  YOB: 1928    HPI:     Chief Complaint   Patient presents with    Follow-up     pt does c/o having sob and told nothing can be done about it due to heart issues reports when sits is fine but when does something get sob    Anxiety    Gastroesophageal Reflux    Hypertension       HPI  Anxiety and insomnia. Patient takes half to 1 tablet of Xanax each day as needed in the evenings before bedtime. No fall history at home and feeling well on this dosage. Hypertension, CHF, stable, chronic   continues to take Coreg 3.125 twice daily, baby aspirin daily, Lasix only if weight up over 2 pounds in a 24-hour. CAD no recent history of chest pain, no shortness of breath, no dizziness. Patient is hard of hearing. Lives alone. No concerns or problems today. Requests refills. .  Medications  Prior to Visit Medications    Medication Sig Taking? Authorizing Provider   ALPRAZolam (XANAX) 0.5 MG tablet take 1 tablet by mouth at bedtime AND 1 DURING THE DAY IF NEEDED Yes Historical Provider, MD   carvedilol (COREG) 3.125 MG tablet Take 1 tablet by mouth 2 times daily (with meals) Yes Padmini Cohen MD   aspirin 81 MG chewable tablet Take 81 mg by mouth daily Yes Historical Provider, MD   potassium chloride (KLOR-CON M) 10 MEQ extended release tablet Once daily as needed (take with furosemide)  Patient not taking: Reported on 6/26/2020  Padmini Cohen MD   furosemide (LASIX) 20 MG tablet Once daily as needed for swelling  Patient not taking: Reported on 6/26/2020  Padmini Cohen MD   nitroGLYCERIN (NITROSTAT) 0.4 MG SL tablet Place 1 tablet under the tongue every 5 minutes as needed for Chest pain up to max of 3 total doses.  If no relief after 1 dose, call 911. Patient not taking: Reported on 6/26/2020  Félix Cerda MD   Neomycin-Polymyxin-Dexameth 0.1 % OINT Apply 0.5 inches to eye 4 times daily  Patient not taking: Reported on 6/26/2020  Arturo Walter MD   artificial tears (ARTIFICIAL TEARS) Madison Reed as needed Indications: 1 drop in each eye twice daily  Historical Provider, MD        The patient is allergic to asa [aspirin]; hctz [hydrochlorothiazide]; lisinopril; and sulfa antibiotics. Past Medical History  Carmen Mcqueen  has a past medical history of Anxiety, GERD (gastroesophageal reflux disease), H/O cardiac catheterization, Hearing loss, and Hypertension. Past Surgical History  The patient  has a past surgical history that includes Cholecystectomy; Breast biopsy (Right, 07/2005); Appendectomy; laparotomy; Cardiac catheterization (2010); malignant skin lesion excision (Left, 2014); and Inner ear surgery (Right, 1989). Family History  This patient's family history includes Cancer in her sister; Coronary Art Dis in her brother and father. Social History  Carmen Mcqueen  reports that she has never smoked. She has never used smokeless tobacco. She reports that she does not drink alcohol or use drugs. Health Maintenance:    Health Maintenance   Topic Date Due    DTaP/Tdap/Td vaccine (1 - Tdap) 02/09/1947    Shingles Vaccine (2 of 3) 11/26/2012    Annual Wellness Visit (AWV)  05/29/2019    Potassium monitoring  11/15/2020    Creatinine monitoring  11/15/2020    Flu vaccine  Completed    Pneumococcal 65+ years Vaccine  Completed    Hepatitis A vaccine  Aged Out    Hepatitis B vaccine  Aged Out    Hib vaccine  Aged Out    Meningococcal (ACWY) vaccine  Aged Out       Subjective:      Review of Systems   Constitutional: Negative for chills, fatigue and fever. HENT: Negative for congestion. Respiratory: Negative for cough, shortness of breath and wheezing. Cardiovascular: Negative for chest pain, palpitations and leg swelling.    Gastrointestinal: Negative for abdominal pain. Neurological: Negative for dizziness. Objective:     /70   Pulse 77   SpO2 98%     Physical Exam  Vitals signs and nursing note reviewed. Constitutional:       Appearance: Normal appearance. Comments: Unassisted with cane, limp. Cardiovascular:      Rate and Rhythm: Normal rate and regular rhythm. Heart sounds: S1 normal and S2 normal.   Pulmonary:      Effort: Pulmonary effort is normal.      Breath sounds: Normal breath sounds. Abdominal:      General: Bowel sounds are normal.      Palpations: Abdomen is soft. Musculoskeletal:      Right lower leg: No edema. Left lower leg: No edema. Skin:     General: Skin is warm. Neurological:      Mental Status: She is alert. Psychiatric:         Attention and Perception: Attention normal.         Behavior: Behavior is cooperative. Judgment: Judgment normal.         Labs Reviewed 6/26/2020:    Lab Results   Component Value Date    WBC 7.5 11/15/2019    HGB 12.6 11/15/2019    HCT 37.6 11/15/2019     11/15/2019    CHOL 174 04/23/2018    TRIG 95 04/23/2018    HDL 62 04/23/2018    ALT 10 04/23/2018    AST 20 04/23/2018     11/15/2019    K 4.2 11/15/2019     11/15/2019    CREATININE 0.77 11/15/2019    BUN 18 11/15/2019    CO2 26 11/15/2019    INR 1.1 02/06/2019    GLUF 134 (H) 04/23/2018       Assessment/Plan      1. Anxiety disorder, unspecified type  Risk and benefit discussed. To take only as directed and sparingly for anxiety.  - ALPRAZolam (XANAX) 0.5 MG tablet; take 1 tablet by mouth at bedtime AND 1 DURING THE DAY IF NEEDED  Dispense: 30 tablet; Refill: 2  - TSH without Reflex; Future    2. Chronic diastolic heart failure (HCC)  Daily at home weights encouraged, if weight changes by more than 2 pounds she is to call the office and take Lasix  - TSH without Reflex; Future    3. Essential hypertension  encouraged hydration and checking blood pressures at home.   - Comprehensive

## 2020-11-30 ENCOUNTER — TELEPHONE (OUTPATIENT)
Dept: FAMILY MEDICINE CLINIC | Age: 85
End: 2020-11-30

## 2020-12-04 RX ORDER — CARVEDILOL 3.12 MG/1
TABLET ORAL
Qty: 180 TABLET | Refills: 3 | Status: SHIPPED | OUTPATIENT
Start: 2020-12-04 | End: 2021-08-06 | Stop reason: SDUPTHER

## 2020-12-04 RX ORDER — CARVEDILOL 3.12 MG/1
3.12 TABLET ORAL 2 TIMES DAILY WITH MEALS
Qty: 180 TABLET | Refills: 3 | OUTPATIENT
Start: 2020-12-04

## 2020-12-04 NOTE — TELEPHONE ENCOUNTER
[facility] is calling to request a refill on the following medication(s):  Requested Prescriptions     Pending Prescriptions Disp Refills    carvedilol (COREG) 3.125 MG tablet [Pharmacy Med Name: CARVEDILOL 3.125 MG TABLET] 180 tablet 3     Sig: take 1 tablet by mouth twice a day with food       Last Visit Date (If Applicable):  95/44/4327    Next Visit Date:    Visit date not found

## 2021-01-29 ENCOUNTER — OFFICE VISIT (OUTPATIENT)
Dept: FAMILY MEDICINE CLINIC | Age: 86
End: 2021-01-29
Payer: MEDICARE

## 2021-01-29 VITALS
DIASTOLIC BLOOD PRESSURE: 74 MMHG | WEIGHT: 147 LBS | OXYGEN SATURATION: 99 % | SYSTOLIC BLOOD PRESSURE: 122 MMHG | HEART RATE: 74 BPM | BODY MASS INDEX: 23.73 KG/M2

## 2021-01-29 DIAGNOSIS — I50.32 CHRONIC DIASTOLIC HEART FAILURE (HCC): ICD-10-CM

## 2021-01-29 DIAGNOSIS — I10 ESSENTIAL HYPERTENSION: Primary | ICD-10-CM

## 2021-01-29 DIAGNOSIS — F41.9 ANXIETY DISORDER, UNSPECIFIED TYPE: ICD-10-CM

## 2021-01-29 PROCEDURE — 4040F PNEUMOC VAC/ADMIN/RCVD: CPT | Performed by: NURSE PRACTITIONER

## 2021-01-29 PROCEDURE — G8427 DOCREV CUR MEDS BY ELIG CLIN: HCPCS | Performed by: NURSE PRACTITIONER

## 2021-01-29 PROCEDURE — 99211 OFF/OP EST MAY X REQ PHY/QHP: CPT | Performed by: NURSE PRACTITIONER

## 2021-01-29 PROCEDURE — G8484 FLU IMMUNIZE NO ADMIN: HCPCS | Performed by: NURSE PRACTITIONER

## 2021-01-29 PROCEDURE — 1036F TOBACCO NON-USER: CPT | Performed by: NURSE PRACTITIONER

## 2021-01-29 PROCEDURE — G8420 CALC BMI NORM PARAMETERS: HCPCS | Performed by: NURSE PRACTITIONER

## 2021-01-29 PROCEDURE — 99214 OFFICE O/P EST MOD 30 MIN: CPT | Performed by: NURSE PRACTITIONER

## 2021-01-29 PROCEDURE — 1123F ACP DISCUSS/DSCN MKR DOCD: CPT | Performed by: NURSE PRACTITIONER

## 2021-01-29 PROCEDURE — 1090F PRES/ABSN URINE INCON ASSESS: CPT | Performed by: NURSE PRACTITIONER

## 2021-01-29 RX ORDER — ALPRAZOLAM 0.25 MG/1
0.25 TABLET ORAL 2 TIMES DAILY
Qty: 180 TABLET | Refills: 1 | Status: SHIPPED | OUTPATIENT
Start: 2021-01-29 | End: 2021-05-10 | Stop reason: SDUPTHER

## 2021-01-29 RX ORDER — ALPRAZOLAM 0.5 MG/1
0.5 TABLET ORAL NIGHTLY PRN
COMMUNITY
End: 2021-07-02

## 2021-01-29 ASSESSMENT — PATIENT HEALTH QUESTIONNAIRE - PHQ9
SUM OF ALL RESPONSES TO PHQ QUESTIONS 1-9: 2
SUM OF ALL RESPONSES TO PHQ QUESTIONS 1-9: 2
SUM OF ALL RESPONSES TO PHQ9 QUESTIONS 1 & 2: 2
2. FEELING DOWN, DEPRESSED OR HOPELESS: 1
1. LITTLE INTEREST OR PLEASURE IN DOING THINGS: 1

## 2021-01-29 NOTE — PROGRESS NOTES
[facility]  [address] [place] [address] [place] [address]  Dept: [phone]  Dept Fax: [phone]  Loc: [phone]     Visit Date:  1/29/2021    Patient:  Minal Calzada  YOB: 1928    HPI:     Chief Complaint   Patient presents with    Medication Refill       HPI    Follow-up       pt does c/o having sob, chronic and stable, helps when she takes xanax    Anxiety    Gastroesophageal Reflux    Hypertension         HPI  Anxiety and insomnia. Patient takes half to 1 tablet of Xanax each day as needed in the evenings before bedtime. and she is open to one in midday to help with her chronic SOB     No fall history at home and feeling well on this dosage. Hypertension, CHF, stable, chronic   continues to take Coreg 3.125 twice daily, baby aspirin daily, no edema, no weight change , no change appetitie  CAD no recent history of chest pain, no shortness of breath, no dizziness. Patient is hard of hearing. Lives alone. No concerns or problems today. Requests refills. .      Medications  Prior to Visit Medications    Medication Sig Taking? Authorizing Provider   ALPRAZolam (XANAX) 0.25 MG tablet Take 1 tablet by mouth 2 times daily for 180 days. take 1 tablet by mouth at bedtime AND 1 DURING THE DAY IF NEEDED Yes JOHN Whipple CNP   ALPRAZolam [place]) 0.5 MG tablet Take 0.5 mg by mouth nightly as needed for Sleep. Yes Historical Provider, MD   carvedilol (COREG) 3.125 MG tablet take 1 tablet by mouth twice a day with food Yes JOHN Whipple CNP   nitroGLYCERIN (NITROSTAT) 0.4 MG SL tablet Place 1 tablet under the tongue every 5 minutes as needed for Chest pain up to max of 3 total doses. If no relief after 1 dose, call 911.  Yes Abel Rene MD   aspirin 81 MG chewable tablet Take 81 mg by mouth daily Yes Historical Provider, MD   potassium chloride (KLOR-CON M) 10 MEQ extended release tablet Once daily as needed (take with furosemide)  Patient not taking: Reported on 1/29/2021  Yue Portillo MD        The patient is allergic to asa [aspirin]; hctz [hydrochlorothiazide]; lisinopril; and sulfa antibiotics. Past Medical History  Kristan Santamaria  has a past medical history of Anxiety, GERD (gastroesophageal reflux disease), H/O cardiac catheterization, Hearing loss, and Hypertension. Past Surgical History  The patient  has a past surgical history that includes Cholecystectomy; Breast biopsy (Right, 07/2005); Appendectomy; laparotomy; Cardiac catheterization (2010); malignant skin lesion excision (Left, 2014); and Inner ear surgery (Right, 1989). Family History  This patient's family history includes Cancer in her sister; Coronary Art Dis in her brother and father. Social History  Kristan Santamaria  reports that she has never smoked. She has never used smokeless tobacco. She reports that she does not drink alcohol or use drugs. Health Maintenance:    Health Maintenance   Topic Date Due    COVID-19 Vaccine (1 of 2) 02/09/1944    DTaP/Tdap/Td vaccine (1 - Tdap) 02/09/1947    Shingles Vaccine (2 of 3) 11/26/2012    Annual Wellness Visit (AWV)  05/29/2019    Potassium monitoring  11/15/2020    Creatinine monitoring  11/15/2020    Flu vaccine  Completed    Pneumococcal 65+ years Vaccine  Completed    Hepatitis A vaccine  Aged Out    Hepatitis B vaccine  Aged Out    Hib vaccine  Aged Out    Meningococcal (ACWY) vaccine  Aged Out       Subjective:      Review of Systems   Constitutional: Negative for chills, fatigue and fever. HENT: Negative for congestion. Respiratory: Negative for cough, shortness of breath and wheezing. Cardiovascular: Negative for chest pain, palpitations and leg swelling. Gastrointestinal: Negative for abdominal pain. Neurological: Negative for dizziness.        Objective:     /74   Pulse 74   Wt 147 lb (66.7 kg)   SpO2 99%   BMI 23.73 kg/m²     Physical Exam  Vitals signs and nursing note reviewed. Constitutional:       Appearance: Normal appearance. Cardiovascular:      Rate and Rhythm: Normal rate and regular rhythm. Heart sounds: S1 normal and S2 normal.   Pulmonary:      Effort: Pulmonary effort is normal.      Breath sounds: Normal breath sounds. Abdominal:      General: Bowel sounds are normal.      Palpations: Abdomen is soft. Musculoskeletal:      Right lower leg: No edema. Left lower leg: No edema. Comments: Exam in wheelchair   Skin:     General: Skin is warm. Neurological:      Mental Status: She is alert. Psychiatric:         Attention and Perception: Attention normal.         Behavior: Behavior is cooperative. Judgment: Judgment normal.         Labs Reviewed 1/29/2021:    Lab Results   Component Value Date    WBC 7.5 11/15/2019    HGB 12.6 11/15/2019    HCT 37.6 11/15/2019     11/15/2019    CHOL 174 04/23/2018    TRIG 95 04/23/2018    HDL 62 04/23/2018    ALT 10 04/23/2018    AST 20 04/23/2018     11/15/2019    K 4.2 11/15/2019     11/15/2019    CREATININE 0.77 11/15/2019    BUN 18 11/15/2019    CO2 26 11/15/2019    INR 1.1 02/06/2019    GLUF 134 (H) 04/23/2018       Assessment/Plan      1. Anxiety disorder, unspecified type  Smaller tablet and ok to take half dose or 1 tablet twice daily now, education understood    State OARRS reviewed   - ALPRAZolam (XANAX) 0.25 MG tablet; Take 1 tablet by mouth 2 times daily for 180 days. take 1 tablet by mouth at bedtime AND 1 DURING THE DAY IF NEEDED  Dispense: 180 tablet; Refill: 1  2CHF stable, chronic,    3 HTN, stable, chronic    Avoid salt and cardiac diet encouraged     advised patient to follow-up in 3 months, encouraged her to do daily weights and monitor for weight gain or loss    Return in about 3 months (around 4/29/2021). Patient given educational materials - see patient instructions. Discussed use, benefit, and side effects of prescribed medications.   All patient questions answered. Pt voiced understanding. Reviewed health maintenance.        Electronically signed Harper Figueroa, APRN - CNP on 1/29/2021 at 11:34 AM

## 2021-02-02 ASSESSMENT — ENCOUNTER SYMPTOMS
WHEEZING: 0
ABDOMINAL PAIN: 0
SHORTNESS OF BREATH: 0
COUGH: 0

## 2021-03-05 ENCOUNTER — IMMUNIZATION (OUTPATIENT)
Dept: LAB | Age: 86
End: 2021-03-05
Payer: MEDICARE

## 2021-03-05 PROCEDURE — 91301 COVID-19, MODERNA VACCINE 100MCG/0.5ML DOSE: CPT

## 2021-04-02 ENCOUNTER — IMMUNIZATION (OUTPATIENT)
Dept: LAB | Age: 86
End: 2021-04-02
Payer: MEDICARE

## 2021-04-02 PROCEDURE — 0012A COVID-19, MODERNA VACCINE 100MCG/0.5ML DOSE: CPT

## 2021-05-07 DIAGNOSIS — F41.9 ANXIETY DISORDER, UNSPECIFIED TYPE: ICD-10-CM

## 2021-05-07 NOTE — TELEPHONE ENCOUNTER
[facility] is requesting a refill on the following medication(s):  Requested Prescriptions     Pending Prescriptions Disp Refills    ALPRAZolam (XANAX) 0.25 MG tablet 180 tablet 1     Sig: Take 1 tablet by mouth 2 times daily for 180 days.  take 1 tablet by mouth at bedtime AND 1 DURING THE DAY IF NEEDED       Last Visit Date (If Applicable):  2/40/4242    Next Visit Date:    6/28/2021

## 2021-05-10 RX ORDER — ALPRAZOLAM 0.25 MG/1
0.25 TABLET ORAL 2 TIMES DAILY
Qty: 180 TABLET | Refills: 1 | Status: SHIPPED | OUTPATIENT
Start: 2021-05-10 | End: 2021-08-06 | Stop reason: SDUPTHER

## 2021-06-29 ENCOUNTER — VIRTUAL VISIT (OUTPATIENT)
Dept: FAMILY MEDICINE CLINIC | Age: 86
End: 2021-06-29
Payer: MEDICARE

## 2021-06-29 DIAGNOSIS — I10 ESSENTIAL HYPERTENSION: Primary | ICD-10-CM

## 2021-07-02 ENCOUNTER — OFFICE VISIT (OUTPATIENT)
Dept: CARDIOLOGY | Age: 86
End: 2021-07-02
Payer: MEDICARE

## 2021-07-02 VITALS
HEIGHT: 66 IN | DIASTOLIC BLOOD PRESSURE: 66 MMHG | SYSTOLIC BLOOD PRESSURE: 141 MMHG | HEART RATE: 68 BPM | WEIGHT: 145 LBS | BODY MASS INDEX: 23.3 KG/M2

## 2021-07-02 DIAGNOSIS — R06.02 SHORTNESS OF BREATH: ICD-10-CM

## 2021-07-02 DIAGNOSIS — I25.10 CORONARY ARTERY DISEASE INVOLVING NATIVE CORONARY ARTERY OF NATIVE HEART WITHOUT ANGINA PECTORIS: ICD-10-CM

## 2021-07-02 DIAGNOSIS — I50.32 CHRONIC DIASTOLIC HEART FAILURE (HCC): ICD-10-CM

## 2021-07-02 DIAGNOSIS — I10 ESSENTIAL HYPERTENSION: Primary | ICD-10-CM

## 2021-07-02 PROCEDURE — G8427 DOCREV CUR MEDS BY ELIG CLIN: HCPCS | Performed by: INTERNAL MEDICINE

## 2021-07-02 PROCEDURE — 1090F PRES/ABSN URINE INCON ASSESS: CPT | Performed by: INTERNAL MEDICINE

## 2021-07-02 PROCEDURE — 99204 OFFICE O/P NEW MOD 45 MIN: CPT | Performed by: INTERNAL MEDICINE

## 2021-07-02 PROCEDURE — 1036F TOBACCO NON-USER: CPT | Performed by: INTERNAL MEDICINE

## 2021-07-02 PROCEDURE — G8420 CALC BMI NORM PARAMETERS: HCPCS | Performed by: INTERNAL MEDICINE

## 2021-07-02 PROCEDURE — 1123F ACP DISCUSS/DSCN MKR DOCD: CPT | Performed by: INTERNAL MEDICINE

## 2021-07-02 PROCEDURE — 93010 ELECTROCARDIOGRAM REPORT: CPT | Performed by: INTERNAL MEDICINE

## 2021-07-02 PROCEDURE — 93005 ELECTROCARDIOGRAM TRACING: CPT | Performed by: INTERNAL MEDICINE

## 2021-07-02 PROCEDURE — 4040F PNEUMOC VAC/ADMIN/RCVD: CPT | Performed by: INTERNAL MEDICINE

## 2021-07-02 RX ORDER — MULTIVIT WITH MINERALS/LUTEIN
250 TABLET ORAL DAILY
COMMUNITY

## 2021-07-02 RX ORDER — ALBUTEROL SULFATE 90 UG/1
2 AEROSOL, METERED RESPIRATORY (INHALATION) 4 TIMES DAILY PRN
Qty: 3 INHALER | Refills: 1 | Status: SHIPPED | OUTPATIENT
Start: 2021-07-02 | End: 2022-02-07

## 2021-07-02 RX ORDER — M-VIT,TX,IRON,MINS/CALC/FOLIC 27MG-0.4MG
1 TABLET ORAL DAILY
COMMUNITY
End: 2022-09-26

## 2021-07-02 NOTE — PROGRESS NOTES
Cardiology Consultation/Follow Up. [address]  2/9/1928  [id number]     7/2/21    CC: Patient is here for new consult Re: SOB/HERNANDEZ    HPI:   Cely Mitchell is here for new consult Re: SOB/HERNANDEZ. Has had chronic SOB for 1 year now. It is now worsening and occurring with minimal exertion. No LE edema. No chest pain, no orthopnea, pnd, le edema. She apparently has a history of HFpEF and uses lasix as needed only if she has weight gain. Past Medical:  Past Medical History:   Diagnosis Date    Anxiety     GERD (gastroesophageal reflux disease)     H/O cardiac catheterization 07/2010    Hearing loss     deaf in R ear from treatment for Meniere's disease    Hypertension      Past Surgical:  Past Surgical History:   Procedure Laterality Date    APPENDECTOMY      BREAST BIOPSY Right 07/2005    Dr Andreea Light  2010    30% RCA stenosis    CHOLECYSTECTOMY      [place]    [place]    LAPAROTOMY      MALIGNANT SKIN LESION EXCISION Left 2014    squamous cell carcinoma left 3rd toe, done in office     Family History:  Family History   Problem Relation Age of Onset    Coronary Art Dis Father     Cancer Sister         ovarian    Coronary Art Dis Brother      Social History:  Social History     Tobacco Use    Smoking status: Never Smoker    Smokeless tobacco: Never Used   Vaping Use    Vaping Use: Never used   Substance Use Topics    Alcohol use: No    Drug use: No      REVIEW OF SYSTEMS:    · Constitutional: there has been no unanticipated weight loss. There's been No change in energy level, No change in activity level. · Eyes: No visual changes or diplopia. No scleral icterus. · ENT: No Headaches, hearing loss or vertigo. No mouth sores or sore throat. · Cardiovascular: AS HPI  · Respiratory: AS HPI  · Gastrointestinal: No abdominal pain, appetite loss, blood in stools.  No change in bowel or bladder habits. · Genitourinary: No dysuria, trouble voiding, or hematuria. · Musculoskeletal:  No gait disturbance, No weakness or joint complaints. · Integumentary: No rash or pruritis. · Neurological: No headache, diplopia, change in muscle strength, numbness or tingling. No change in gait, balance, coordination, mood, affect, memory, mentation, behavior. · Psychiatric: No new anxiety or depression. · Endocrine: No temperature intolerance. No excessive thirst, fluid intake, or urination. No tremor. · Hematologic/Lymphatic: No abnormal bruising or bleeding, blood clots or swollen lymph nodes. · Allergic/Immunologic: No nasal congestion or hives. Medications:  Current Outpatient Medications   Medication Sig Dispense Refill    Ascorbic Acid (VITAMIN C) 250 MG tablet Take 250 mg by mouth daily      Multiple Vitamins-Minerals (THERAPEUTIC MULTIVITAMIN-MINERALS) tablet Take 1 tablet by mouth daily      ALPRAZolam (XANAX) 0.25 MG tablet Take 1 tablet by mouth 2 times daily for 180 days. take 1 tablet by mouth at bedtime AND 1 DURING THE DAY IF NEEDED 180 tablet 1    carvedilol (COREG) 3.125 MG tablet take 1 tablet by mouth twice a day with food 180 tablet 3    aspirin 81 MG chewable tablet Take 81 mg by mouth daily      potassium chloride (KLOR-CON M) 10 MEQ extended release tablet Once daily as needed (take with furosemide) (Patient not taking: Reported on 1/29/2021) 60 tablet 0    nitroGLYCERIN (NITROSTAT) 0.4 MG SL tablet Place 1 tablet under the tongue every 5 minutes as needed for Chest pain up to max of 3 total doses. If no relief after 1 dose, call 911. (Patient not taking: Reported on 7/2/2021) 25 tablet 1     No current facility-administered medications for this visit.      Physical Exam:   Vitals: BP (!) 147/67   Pulse 68   Ht 5' 6\" (1.676 m)   Wt 145 lb (65.8 kg)   BMI 23.40 kg/m²   General appearance: alert and cooperative with exam  HEENT: Head: Normocephalic, no lesions, without obvious abnormality. Neck: no carotid bruit, no JVD  Lungs: clear to auscultation bilaterally  Heart: regular rate and rhythm, S1, S2 normal, none Murmur  Abdomen: soft, non-tender; bowel sounds normal; no masses,  no organomegaly  Extremities: no site injection hematoma, extremities normal, atraumatic, no cyanosis. no edema  Neurologic: Mental status: Alert, oriented, thought content appropriate    Labs:  Lab Results   Component Value Date    CHOL 174 04/23/2018    TRIG 95 04/23/2018    HDL 62 04/23/2018    LDLCHOLESTEROL 93 04/23/2018    VLDL NOT REPORTED 04/23/2018    CHOLHDLRATIO 2.8 04/23/2018     Lab Results   Component Value Date     11/15/2019    K 4.2 11/15/2019     11/15/2019    CO2 26 11/15/2019    BUN 18 11/15/2019    CREATININE 0.77 11/15/2019    GLUCOSE 106 (H) 11/15/2019    CALCIUM 10.1 11/15/2019    PROT 7.6 04/23/2018    LABALBU 4.0 04/23/2018    BILITOT 0.63 04/23/2018    ALKPHOS 67 04/23/2018    AST 20 04/23/2018    ALT 10 04/23/2018    LABGLOM >60 11/15/2019    GFRAA >60 11/15/2019    GLOB 3.6 06/01/2016     EKG: Sinus  Rhythm   -Poor R-wave progression -nonspecific -consider old anterior infarct. Past Medical and Surgical History, Problem List, Allergies, Medications, Labs, Imaging, all reviewed extensively in EMR and with the patient. Assessment:  - SOB- maybe multifactorial- HFpEF, CAD, vs Pulmonary  - Chronic HFpEF- not fluid overloaded on exam today  - HTN  - Non ob CAD on cath in past    Plan:  - long discussion with patient and family, at this point they would like a more conservative approach  - will hold off on stress testing  - will start with 2d Echo to re eval LVEF  - will give her trial of albuterol inhaler and if her breathing improves, then we can assume her SOB is more pulmonary in nature  - continue current meds- including lasix as needed. The patient is to continue heart healthy diet, weight loss and exercise as tolerated.  Patient's medications and side effects were discussed. Medication refills were provided if needed. Follow up appointment timing was discussed. All questions and concerns were addressed to patient's satisfaction. The patient is to follow up in 1 month or sooner if necessary. Thank you for allowing me to participate in the care of this patient, please do not hesitate to call if you have any questions. Juli Marie DO, [facility] [place], [address], [place] [address] [facility]  [facility]. [facility]  [phone]

## 2021-07-08 ENCOUNTER — HOSPITAL ENCOUNTER (OUTPATIENT)
Dept: NON INVASIVE DIAGNOSTICS | Age: 86
Discharge: HOME | End: 2021-07-08
Payer: MEDICARE

## 2021-07-08 DIAGNOSIS — R06.02 SHORTNESS OF BREATH: ICD-10-CM

## 2021-07-08 LAB
LV EF: 55 %
LVEF MODALITY: NORMAL

## 2021-07-08 PROCEDURE — 93306 TTE W/DOPPLER COMPLETE: CPT

## 2021-07-09 ENCOUNTER — TELEPHONE (OUTPATIENT)
Dept: CARDIOLOGY | Age: 86
End: 2021-07-09

## 2021-08-02 ENCOUNTER — OFFICE VISIT (OUTPATIENT)
Dept: CARDIOLOGY | Age: 86
End: 2021-08-02
Payer: MEDICARE

## 2021-08-02 VITALS
HEIGHT: 66 IN | WEIGHT: 145 LBS | DIASTOLIC BLOOD PRESSURE: 80 MMHG | SYSTOLIC BLOOD PRESSURE: 148 MMHG | HEART RATE: 77 BPM | BODY MASS INDEX: 23.3 KG/M2

## 2021-08-02 DIAGNOSIS — I50.32 CHRONIC DIASTOLIC HEART FAILURE (HCC): ICD-10-CM

## 2021-08-02 DIAGNOSIS — R06.02 SHORTNESS OF BREATH: ICD-10-CM

## 2021-08-02 DIAGNOSIS — I10 ESSENTIAL HYPERTENSION: Primary | ICD-10-CM

## 2021-08-02 DIAGNOSIS — I25.10 CORONARY ARTERY DISEASE INVOLVING NATIVE CORONARY ARTERY OF NATIVE HEART WITHOUT ANGINA PECTORIS: ICD-10-CM

## 2021-08-02 PROCEDURE — G8420 CALC BMI NORM PARAMETERS: HCPCS | Performed by: INTERNAL MEDICINE

## 2021-08-02 PROCEDURE — 4040F PNEUMOC VAC/ADMIN/RCVD: CPT | Performed by: INTERNAL MEDICINE

## 2021-08-02 PROCEDURE — 1090F PRES/ABSN URINE INCON ASSESS: CPT | Performed by: INTERNAL MEDICINE

## 2021-08-02 PROCEDURE — 99213 OFFICE O/P EST LOW 20 MIN: CPT | Performed by: INTERNAL MEDICINE

## 2021-08-02 PROCEDURE — 99214 OFFICE O/P EST MOD 30 MIN: CPT | Performed by: INTERNAL MEDICINE

## 2021-08-02 PROCEDURE — G8427 DOCREV CUR MEDS BY ELIG CLIN: HCPCS | Performed by: INTERNAL MEDICINE

## 2021-08-02 PROCEDURE — 1123F ACP DISCUSS/DSCN MKR DOCD: CPT | Performed by: INTERNAL MEDICINE

## 2021-08-02 PROCEDURE — 1036F TOBACCO NON-USER: CPT | Performed by: INTERNAL MEDICINE

## 2021-08-02 RX ORDER — ISOSORBIDE MONONITRATE 30 MG/1
30 TABLET, EXTENDED RELEASE ORAL DAILY
Qty: 90 TABLET | Refills: 3 | Status: SHIPPED | OUTPATIENT
Start: 2021-08-02 | End: 2021-08-13 | Stop reason: SINTOL

## 2021-08-02 RX ORDER — FUROSEMIDE 20 MG/1
20 TABLET ORAL DAILY PRN
Qty: 90 TABLET | Refills: 1 | Status: SHIPPED | OUTPATIENT
Start: 2021-08-02

## 2021-08-02 NOTE — PROGRESS NOTES
Cardiology Consultation/Follow Up. [address]  2/9/1928  [id number]     8/2/21    CC: Patient is here for follow up for Re: SOB/HERNANDEZ    HPI:   Janene Arias is here for follow up for Re: SOB/HERNANDEZ. SOB somewhat improved with the albuterol. Has some LE edema. Past Medical:  Past Medical History:   Diagnosis Date    Anxiety     GERD (gastroesophageal reflux disease)     H/O cardiac catheterization 07/2010    Hearing loss     deaf in R ear from treatment for Meniere's disease    Hypertension      Past Surgical:  Past Surgical History:   Procedure Laterality Date    APPENDECTOMY      BREAST BIOPSY Right 07/2005    Dr Cal Hernandez  2010    30% RCA stenosis    CHOLECYSTECTOMY      INNER EAR SURGERY Right 1989    [place]    LAPAROTOMY      MALIGNANT SKIN LESION EXCISION Left 2014    squamous cell carcinoma left 3rd toe, done in office     Family History:  Family History   Problem Relation Age of Onset    Coronary Art Dis Father     Cancer Sister         ovarian    Coronary Art Dis Brother      Social History:  Social History     Tobacco Use    Smoking status: Never Smoker    Smokeless tobacco: Never Used   Vaping Use    Vaping Use: Never used   Substance Use Topics    Alcohol use: No    Drug use: No      REVIEW OF SYSTEMS:    · Constitutional: there has been no unanticipated weight loss. There's been No change in energy level, No change in activity level. · Eyes: No visual changes or diplopia. No scleral icterus. · ENT: No Headaches, hearing loss or vertigo. No mouth sores or sore throat. · Cardiovascular: AS HPI  · Respiratory: AS HPI  · Gastrointestinal: No abdominal pain, appetite loss, blood in stools. No change in bowel or bladder habits. · Genitourinary: No dysuria, trouble voiding, or hematuria. · Musculoskeletal:  No gait disturbance, No weakness or joint complaints. · Integumentary: No rash or pruritis.   · Neurological: No headache, diplopia, change in muscle strength, numbness or tingling. No change in gait, balance, coordination, mood, affect, memory, mentation, behavior. · Psychiatric: No new anxiety or depression. · Endocrine: No temperature intolerance. No excessive thirst, fluid intake, or urination. No tremor. · Hematologic/Lymphatic: No abnormal bruising or bleeding, blood clots or swollen lymph nodes. · Allergic/Immunologic: No nasal congestion or hives. Medications:  Current Outpatient Medications   Medication Sig Dispense Refill    Ascorbic Acid (VITAMIN C) 250 MG tablet Take 250 mg by mouth daily      Multiple Vitamins-Minerals (THERAPEUTIC MULTIVITAMIN-MINERALS) tablet Take 1 tablet by mouth daily      albuterol sulfate HFA (VENTOLIN HFA) 108 (90 Base) MCG/ACT inhaler Inhale 2 puffs into the lungs 4 times daily as needed for Wheezing 3 Inhaler 1    ALPRAZolam (XANAX) 0.25 MG tablet Take 1 tablet by mouth 2 times daily for 180 days. take 1 tablet by mouth at bedtime AND 1 DURING THE DAY IF NEEDED 180 tablet 1    carvedilol (COREG) 3.125 MG tablet take 1 tablet by mouth twice a day with food 180 tablet 3    potassium chloride (KLOR-CON M) 10 MEQ extended release tablet Once daily as needed (take with furosemide) 60 tablet 0    nitroGLYCERIN (NITROSTAT) 0.4 MG SL tablet Place 1 tablet under the tongue every 5 minutes as needed for Chest pain up to max of 3 total doses. If no relief after 1 dose, call 911. 25 tablet 1    aspirin 81 MG chewable tablet Take 81 mg by mouth daily       No current facility-administered medications for this visit. Physical Exam:   Vitals: BP (!) 154/80 (Site: Left Upper Arm, Position: Sitting, Cuff Size: Medium Adult)   Pulse 77   Ht 5' 6\" (1.676 m)   Wt 145 lb (65.8 kg)   BMI 23.40 kg/m²   General appearance: alert and cooperative with exam  HEENT: Head: Normocephalic, no lesions, without obvious abnormality.   Neck: no carotid bruit, no JVD  Lungs: clear to auscultation bilaterally  Heart: regular rate and rhythm, S1, S2 normal, none Murmur  Abdomen: soft, non-tender; bowel sounds normal; no masses,  no organomegaly  Extremities: no site injection hematoma, extremities normal, atraumatic, no cyanosis. Trace edema  Neurologic: Mental status: Alert, oriented, thought content appropriate    Labs:  Lab Results   Component Value Date    CHOL 174 04/23/2018    TRIG 95 04/23/2018    HDL 62 04/23/2018    LDLCHOLESTEROL 93 04/23/2018    VLDL NOT REPORTED 04/23/2018    CHOLHDLRATIO 2.8 04/23/2018     Lab Results   Component Value Date     11/15/2019    K 4.2 11/15/2019     11/15/2019    CO2 26 11/15/2019    BUN 18 11/15/2019    CREATININE 0.77 11/15/2019    GLUCOSE 106 (H) 11/15/2019    CALCIUM 10.1 11/15/2019    PROT 7.6 04/23/2018    LABALBU 4.0 04/23/2018    BILITOT 0.63 04/23/2018    ALKPHOS 67 04/23/2018    AST 20 04/23/2018    ALT 10 04/23/2018    LABGLOM >60 11/15/2019    GFRAA >60 11/15/2019    GLOB 3.6 06/01/2016     EKG: Sinus  Rhythm   -Poor R-wave progression -nonspecific -consider old anterior infarct. Echo 7/21:  Left ventricular systolic function is normal.  Left ventricular ejection fraction 55 %. Grade I (mild) left ventricular diastolic dysfunction. Aortic valve sclerosis with no stenosis  Mild mitral regurgitation. Normal function of other valves. No pericardial effusion. Past Medical and Surgical History, Problem List, Allergies, Medications, Labs, Imaging, all reviewed extensively in EMR and with the patient.     Assessment:  - SOB- maybe multifactorial- HFpEF, CAD, vs Pulmonary  - Chronic HFpEF with some LE edema.   - HTN  - Non ob CAD on cath in past- possible angina  - Preserved LVEF Echo 7/21- with mild MR    Plan:  - long discussion with patient and family, at this point they would like a more conservative approach and currently dont want a stress test  - will add imdur 30 mg po qd  - will add lasix 20 gm po qd prn  - continue albuterol PRN    The patient is to continue heart healthy diet, weight loss and exercise as tolerated. Patient's medications and side effects were discussed. Medication refills were provided if needed. Follow up appointment timing was discussed. All questions and concerns were addressed to patient's satisfaction. The patient is to follow up in 6 month or sooner if necessary. Thank you for allowing me to participate in the care of this patient, please do not hesitate to call if you have any questions. Martin Arita DO, [facility] [place], [address], [address], [place] [address] [facility]  [facility]. [facility]  [phone]

## 2021-08-06 ENCOUNTER — OFFICE VISIT (OUTPATIENT)
Dept: FAMILY MEDICINE CLINIC | Age: 86
End: 2021-08-06
Payer: MEDICARE

## 2021-08-06 VITALS
HEART RATE: 92 BPM | WEIGHT: 145 LBS | DIASTOLIC BLOOD PRESSURE: 80 MMHG | HEIGHT: 67 IN | RESPIRATION RATE: 12 BRPM | SYSTOLIC BLOOD PRESSURE: 136 MMHG | TEMPERATURE: 97.7 F | OXYGEN SATURATION: 95 % | BODY MASS INDEX: 22.76 KG/M2

## 2021-08-06 DIAGNOSIS — F41.9 ANXIETY DISORDER, UNSPECIFIED TYPE: ICD-10-CM

## 2021-08-06 DIAGNOSIS — I10 ESSENTIAL HYPERTENSION: ICD-10-CM

## 2021-08-06 PROCEDURE — 99213 OFFICE O/P EST LOW 20 MIN: CPT | Performed by: NURSE PRACTITIONER

## 2021-08-06 PROCEDURE — 1090F PRES/ABSN URINE INCON ASSESS: CPT | Performed by: NURSE PRACTITIONER

## 2021-08-06 PROCEDURE — 1036F TOBACCO NON-USER: CPT | Performed by: NURSE PRACTITIONER

## 2021-08-06 PROCEDURE — 4040F PNEUMOC VAC/ADMIN/RCVD: CPT | Performed by: NURSE PRACTITIONER

## 2021-08-06 PROCEDURE — 1123F ACP DISCUSS/DSCN MKR DOCD: CPT | Performed by: NURSE PRACTITIONER

## 2021-08-06 PROCEDURE — 99213 OFFICE O/P EST LOW 20 MIN: CPT

## 2021-08-06 PROCEDURE — G8420 CALC BMI NORM PARAMETERS: HCPCS | Performed by: NURSE PRACTITIONER

## 2021-08-06 PROCEDURE — G8427 DOCREV CUR MEDS BY ELIG CLIN: HCPCS | Performed by: NURSE PRACTITIONER

## 2021-08-06 RX ORDER — ALPRAZOLAM 0.25 MG/1
0.25 TABLET ORAL 2 TIMES DAILY
Qty: 180 TABLET | Refills: 1 | Status: SHIPPED | OUTPATIENT
Start: 2021-08-06 | End: 2022-02-07

## 2021-08-06 RX ORDER — CARVEDILOL 3.12 MG/1
TABLET ORAL
Qty: 180 TABLET | Refills: 0 | Status: SHIPPED | OUTPATIENT
Start: 2021-08-06 | End: 2021-11-08

## 2021-08-06 SDOH — ECONOMIC STABILITY: FOOD INSECURITY: WITHIN THE PAST 12 MONTHS, THE FOOD YOU BOUGHT JUST DIDN'T LAST AND YOU DIDN'T HAVE MONEY TO GET MORE.: NEVER TRUE

## 2021-08-06 SDOH — ECONOMIC STABILITY: FOOD INSECURITY: WITHIN THE PAST 12 MONTHS, YOU WORRIED THAT YOUR FOOD WOULD RUN OUT BEFORE YOU GOT MONEY TO BUY MORE.: NEVER TRUE

## 2021-08-06 ASSESSMENT — SOCIAL DETERMINANTS OF HEALTH (SDOH): HOW HARD IS IT FOR YOU TO PAY FOR THE VERY BASICS LIKE FOOD, HOUSING, MEDICAL CARE, AND HEATING?: NOT HARD AT ALL

## 2021-08-06 NOTE — PROGRESS NOTES
[facility] [address] [address]  Dept: [phone]  Dept Fax: [phone]  Loc: [phone]     Visit Date:  8/6/2021    Patient:  Parvin Dong  YOB: 1928    HPI:     Chief Complaint   Patient presents with    Other     Pt is here for medication refills and to get her left ear flushed. Conditions recheck of chronic, stable  HPI  Generalized anxiety, chronic, stable continues to take Xanax as needed, OARRS reviewed, no red flags      Essential hypertension, chronic, controlled blood pressure today is 136/80, no weakness, no chest pain, no shortness of breath, here in a wheelchair and driven by son, talkative, pleasant, afebrile  Medications  Prior to Visit Medications    Medication Sig Taking? Authorizing Provider   isosorbide mononitrate (IMDUR) 30 MG extended release tablet Take 1 tablet by mouth daily Yes Mayur Castañeda DO   furosemide (LASIX) 20 MG tablet Take 1 tablet by mouth daily as needed (PRN swelling, weight gain) Yes Mayur Castañeda DO   Ascorbic Acid (VITAMIN C) 250 MG tablet Take 250 mg by mouth daily Yes Historical Provider, MD   Multiple Vitamins-Minerals (THERAPEUTIC MULTIVITAMIN-MINERALS) tablet Take 1 tablet by mouth daily Yes Historical Provider, MD   albuterol sulfate HFA (VENTOLIN HFA) 108 (90 Base) MCG/ACT inhaler Inhale 2 puffs into the lungs 4 times daily as needed for Wheezing Yes Mayur Castañeda DO   ALPRAZolam (XANAX) 0.25 MG tablet Take 1 tablet by mouth 2 times daily for 180 days. take 1 tablet by mouth at bedtime AND 1 DURING THE DAY IF NEEDED Yes JOHN Grimm CNP   carvedilol (COREG) 3.125 MG tablet take 1 tablet by mouth twice a day with food Yes JOHN Grimm CNP   nitroGLYCERIN (NITROSTAT) 0.4 MG SL tablet Place 1 tablet under the tongue every 5 minutes as needed for Chest pain up to max of 3 total doses. If no relief after 1 dose, call 911.  Yes Hill Vera MD   aspirin 81 MG chewable tablet Take 81 mg by mouth daily Yes Historical Provider, MD   potassium chloride (KLOR-CON M) 10 MEQ extended release tablet Once daily as needed (take with furosemide)  Patient not taking: Reported on 8/6/2021  Hill Vera MD        The patient is allergic to asa [aspirin], hctz [hydrochlorothiazide], lisinopril, and sulfa antibiotics. Past Medical History  Thaddeus Gregory  has a past medical history of Anxiety, GERD (gastroesophageal reflux disease), H/O cardiac catheterization, Hearing loss, and Hypertension. Past Surgical History  The patient  has a past surgical history that includes Cholecystectomy; Breast biopsy (Right, 07/2005); Appendectomy; laparotomy; Cardiac catheterization (2010); malignant skin lesion excision (Left, 2014); and Inner ear surgery (Right, 1989). Family History  This patient's family history includes Cancer in her sister; Coronary Art Dis in her brother and father. Social History  Thaddeus Gregory  reports that she has never smoked. She has never used smokeless tobacco. She reports that she does not drink alcohol and does not use drugs. Health Maintenance:    Health Maintenance   Topic Date Due    DTaP/Tdap/Td vaccine (1 - Tdap) Never done    Shingles Vaccine (2 of 3) 11/26/2012    Annual Wellness Visit (AWV)  Never done    Potassium monitoring  11/15/2020    Creatinine monitoring  11/15/2020    Flu vaccine (1) 09/01/2021    Pneumococcal 65+ years Vaccine  Completed    COVID-19 Vaccine  Completed    Hepatitis A vaccine  Aged Out    Hepatitis B vaccine  Aged Out    Hib vaccine  Aged Out    Meningococcal (ACWY) vaccine  Aged Out       Subjective:      Review of Systems   Constitutional: Negative for chills, fatigue and fever. HENT: Negative for congestion. Respiratory: Negative for cough, shortness of breath and wheezing. Cardiovascular: Negative for chest pain, palpitations and leg swelling.    Gastrointestinal: Negative for days. take 1 tablet by mouth at bedtime AND 1 DURING THE DAY IF NEEDED  Dispense: 180 tablet; Refill: 1    2. Essential hypertension  Hypertension I have reviewed historical vital signs, lab work, and most recent patient encounter. Discussion of healthful lifestyle, low-sodium diet, cardiovascular activity on a routine basis emphasized today, time allotted for questions and answer. Back in 3-6 months with an office visit and labs as appropriate medications reviewed and refilled as appropriate, previous testing discussed, side effects complaints none to speak of      - carvedilol (COREG) 3.125 MG tablet; take 1 tablet by mouth twice a day with food  Dispense: 180 tablet; Refill: 0      No follow-ups on file. Patient given educational materials - see patient instructions. Discussed use, benefit, and side effects of prescribed medications. All patient questions answered. Pt voiced understanding. Reviewed health maintenance.        Electronically signed JOHN Reyes CNP on 8/6/2021 at 4:19 PM

## 2021-08-10 ASSESSMENT — ENCOUNTER SYMPTOMS
WHEEZING: 0
COUGH: 0
ABDOMINAL PAIN: 0
SHORTNESS OF BREATH: 0

## 2021-08-12 ENCOUNTER — TELEPHONE (OUTPATIENT)
Dept: CARDIOLOGY | Age: 86
End: 2021-08-12

## 2021-08-17 NOTE — TELEPHONE ENCOUNTER
Spoke with pt and relayed message. She states she will try taking half and then d/c if needed. She will report back any issues.

## 2021-11-08 DIAGNOSIS — I10 ESSENTIAL HYPERTENSION: ICD-10-CM

## 2021-11-08 RX ORDER — CARVEDILOL 3.12 MG/1
TABLET ORAL
Qty: 180 TABLET | Refills: 0 | Status: SHIPPED | OUTPATIENT
Start: 2021-11-08 | End: 2022-02-07

## 2021-11-08 NOTE — TELEPHONE ENCOUNTER
Terri Guerra is requesting a refill on the following medication(s):  Requested Prescriptions     Pending Prescriptions Disp Refills    carvedilol (COREG) 3.125 MG tablet [Pharmacy Med Name: CARVEDILOL 3.125 MG TABLET] 180 tablet 0     Sig: take 1 tablet by mouth twice a day with food       Last Visit Date (If Applicable):  6/7/1851    Next Visit Date:    11/8/2021

## 2021-11-15 ENCOUNTER — OFFICE VISIT (OUTPATIENT)
Dept: FAMILY MEDICINE CLINIC | Age: 86
End: 2021-11-15
Payer: MEDICARE

## 2021-11-15 VITALS
TEMPERATURE: 96.4 F | SYSTOLIC BLOOD PRESSURE: 122 MMHG | HEART RATE: 70 BPM | DIASTOLIC BLOOD PRESSURE: 64 MMHG | RESPIRATION RATE: 20 BRPM | OXYGEN SATURATION: 98 %

## 2021-11-15 DIAGNOSIS — I10 ESSENTIAL HYPERTENSION: ICD-10-CM

## 2021-11-15 DIAGNOSIS — B07.9 VIRAL WART ON FINGER: Primary | ICD-10-CM

## 2021-11-15 PROCEDURE — G8420 CALC BMI NORM PARAMETERS: HCPCS | Performed by: NURSE PRACTITIONER

## 2021-11-15 PROCEDURE — 1036F TOBACCO NON-USER: CPT | Performed by: NURSE PRACTITIONER

## 2021-11-15 PROCEDURE — G8484 FLU IMMUNIZE NO ADMIN: HCPCS | Performed by: NURSE PRACTITIONER

## 2021-11-15 PROCEDURE — 1123F ACP DISCUSS/DSCN MKR DOCD: CPT | Performed by: NURSE PRACTITIONER

## 2021-11-15 PROCEDURE — 99212 OFFICE O/P EST SF 10 MIN: CPT | Performed by: NURSE PRACTITIONER

## 2021-11-15 PROCEDURE — 1090F PRES/ABSN URINE INCON ASSESS: CPT | Performed by: NURSE PRACTITIONER

## 2021-11-15 PROCEDURE — 4040F PNEUMOC VAC/ADMIN/RCVD: CPT | Performed by: NURSE PRACTITIONER

## 2021-11-15 PROCEDURE — G8427 DOCREV CUR MEDS BY ELIG CLIN: HCPCS | Performed by: NURSE PRACTITIONER

## 2021-11-15 PROCEDURE — 99213 OFFICE O/P EST LOW 20 MIN: CPT | Performed by: NURSE PRACTITIONER

## 2021-11-15 ASSESSMENT — PATIENT HEALTH QUESTIONNAIRE - PHQ9
SUM OF ALL RESPONSES TO PHQ9 QUESTIONS 1 & 2: 1
1. LITTLE INTEREST OR PLEASURE IN DOING THINGS: 0
2. FEELING DOWN, DEPRESSED OR HOPELESS: 1
SUM OF ALL RESPONSES TO PHQ QUESTIONS 1-9: 1

## 2021-11-15 NOTE — PROGRESS NOTES
[facility]  [address] [place] [address]  Dept: [phone]  Dept Fax: [phone]  Loc: [phone]     Visit Date:  11/15/2021    Patient:  Madison Carmona  YOB: 1928    HPI:     Chief Complaint   Patient presents with    3 Month Follow-Up     Hypertension- bp has been elevated at home, GERD, Anxiety. Skin tag little finger left hand. Pt c/o being SOB. - Albuterol made the back of her tongue swell. HTN, controlled, chronic and stable  Finger , wart. HPI  Slight SOB with walking long distance, no CP or edema, No trouble with BP pills, excellent medication compliance with lasix and coreg  BP today stable 122/64 and weight maintained  145#,     Wishes to have a wart froze off her finger,        Medications  Prior to Visit Medications    Medication Sig Taking? Authorizing Provider   carvedilol (COREG) 3.125 MG tablet take 1 tablet by mouth twice a day with food Yes Cyrillfrancis Night, APRN - CNP   ALPRAZolam (XANAX) 0.25 MG tablet Take 1 tablet by mouth 2 times daily for 180 days. take 1 tablet by mouth at bedtime AND 1 DURING THE DAY IF NEEDED Yes Cyrillfrancis Night, APRN - CNP   Ascorbic Acid (VITAMIN C) 250 MG tablet Take 250 mg by mouth daily Yes Historical Provider, MD   Multiple Vitamins-Minerals (THERAPEUTIC MULTIVITAMIN-MINERALS) tablet Take 1 tablet by mouth daily Yes Historical Provider, MD   nitroGLYCERIN (NITROSTAT) 0.4 MG SL tablet Place 1 tablet under the tongue every 5 minutes as needed for Chest pain up to max of 3 total doses. If no relief after 1 dose, call 911.  Yes Suhas Long MD   aspirin 81 MG chewable tablet Take 81 mg by mouth daily Yes Historical Provider, MD   furosemide (LASIX) 20 MG tablet Take 1 tablet by mouth daily as needed (PRN swelling, weight gain)  Patient not taking: Reported on 11/15/2021  Mayur Castañeda DO   albuterol sulfate HFA (VENTOLIN HFA) 108 (90 Base) MCG/ACT inhaler Inhale 2 puffs into the lungs 4 times daily as needed for Wheezing  Patient not taking: Reported on 11/15/2021  Mayur Castañeda DO   potassium chloride (KLOR-CON M) 10 MEQ extended release tablet Once daily as needed (take with furosemide)  Patient not taking: Reported on 8/6/2021  Emelyn Nguyen MD        The patient is allergic to asa [aspirin], hctz [hydrochlorothiazide], imdur [isosorbide nitrate], lisinopril, and sulfa antibiotics. Past Medical History  Mortimer Parkin  has a past medical history of Anxiety, GERD (gastroesophageal reflux disease), H/O cardiac catheterization, Hearing loss, and Hypertension. Past Surgical History  The patient  has a past surgical history that includes Cholecystectomy; Breast biopsy (Right, 07/2005); Appendectomy; laparotomy; Cardiac catheterization (2010); malignant skin lesion excision (Left, 2014); and Inner ear surgery (Right, 1989). Family History  This patient's family history includes Cancer in her sister; Coronary Art Dis in her brother and father. Social History  Mortimer Parkin  reports that she has never smoked. She has never used smokeless tobacco. She reports that she does not drink alcohol and does not use drugs. Health Maintenance:    Health Maintenance   Topic Date Due    DTaP/Tdap/Td vaccine (1 - Tdap) Never done    Shingles Vaccine (2 of 3) 11/26/2012    Annual Wellness Visit (AWV)  Never done    Potassium monitoring  11/15/2020    Creatinine monitoring  11/15/2020    COVID-19 Vaccine (3 - Booster for Moderna series) 10/02/2021    Flu vaccine  Completed    Pneumococcal 65+ years Vaccine  Completed    Hepatitis A vaccine  Aged Out    Hepatitis B vaccine  Aged Out    Hib vaccine  Aged Out    Meningococcal (ACWY) vaccine  Aged Out       Subjective:      Review of Systems   Constitutional: Negative for chills, fatigue and fever. HENT: Negative for congestion. Respiratory: Negative for apnea, cough, chest tightness, shortness of breath and wheezing. (H) 04/23/2018       Assessment/Plan      1. Essential hypertension  I have reviewed historical vital signs, lab work, and most recent patient encounter. Discussion of healthful lifestyle, low-sodium diet, cardiovascular activity on a routine basis emphasized today, time allotted for questions and answer. Back in 3 months with an office visit and labs as appropriate    2. Viral wart on finger  Face performed clean technique x3, 30 seconds each, patient tolerated procedure well recheck in 3 to 4 weeks if needed      Return in about 4 weeks (around 12/13/2021), or if symptoms worsen or fail to improve. Patient given educational materials - see patient instructions. Discussed use, benefit, and side effects of prescribed medications. All patient questions answered. Pt voiced understanding. Reviewed health maintenance.        Electronically signed JOHN Ureña CNP on 11/15/2021 at 9:36 AM

## 2021-11-19 PROBLEM — B07.9 VIRAL WART ON FINGER: Status: ACTIVE | Noted: 2021-11-19

## 2021-11-19 ASSESSMENT — ENCOUNTER SYMPTOMS
SHORTNESS OF BREATH: 0
APNEA: 0
WHEEZING: 0
CHEST TIGHTNESS: 0
COUGH: 0
ABDOMINAL PAIN: 0
NAUSEA: 0

## 2022-02-07 ENCOUNTER — OFFICE VISIT (OUTPATIENT)
Dept: CARDIOLOGY | Age: 87
End: 2022-02-07
Payer: MEDICARE

## 2022-02-07 VITALS
SYSTOLIC BLOOD PRESSURE: 133 MMHG | HEIGHT: 66 IN | BODY MASS INDEX: 23.46 KG/M2 | WEIGHT: 146 LBS | DIASTOLIC BLOOD PRESSURE: 57 MMHG | HEART RATE: 80 BPM

## 2022-02-07 DIAGNOSIS — I25.10 CORONARY ARTERY DISEASE INVOLVING NATIVE CORONARY ARTERY OF NATIVE HEART WITHOUT ANGINA PECTORIS: ICD-10-CM

## 2022-02-07 DIAGNOSIS — R06.02 SHORTNESS OF BREATH: Primary | ICD-10-CM

## 2022-02-07 DIAGNOSIS — F41.9 ANXIETY DISORDER, UNSPECIFIED TYPE: ICD-10-CM

## 2022-02-07 DIAGNOSIS — I50.32 CHRONIC DIASTOLIC HEART FAILURE (HCC): ICD-10-CM

## 2022-02-07 DIAGNOSIS — I10 ESSENTIAL HYPERTENSION: ICD-10-CM

## 2022-02-07 PROCEDURE — 1036F TOBACCO NON-USER: CPT | Performed by: INTERNAL MEDICINE

## 2022-02-07 PROCEDURE — 1123F ACP DISCUSS/DSCN MKR DOCD: CPT | Performed by: INTERNAL MEDICINE

## 2022-02-07 PROCEDURE — 4040F PNEUMOC VAC/ADMIN/RCVD: CPT | Performed by: INTERNAL MEDICINE

## 2022-02-07 PROCEDURE — G8427 DOCREV CUR MEDS BY ELIG CLIN: HCPCS | Performed by: INTERNAL MEDICINE

## 2022-02-07 PROCEDURE — 99214 OFFICE O/P EST MOD 30 MIN: CPT | Performed by: INTERNAL MEDICINE

## 2022-02-07 PROCEDURE — 93005 ELECTROCARDIOGRAM TRACING: CPT | Performed by: INTERNAL MEDICINE

## 2022-02-07 PROCEDURE — G8420 CALC BMI NORM PARAMETERS: HCPCS | Performed by: INTERNAL MEDICINE

## 2022-02-07 PROCEDURE — 93010 ELECTROCARDIOGRAM REPORT: CPT | Performed by: INTERNAL MEDICINE

## 2022-02-07 PROCEDURE — G8484 FLU IMMUNIZE NO ADMIN: HCPCS | Performed by: INTERNAL MEDICINE

## 2022-02-07 PROCEDURE — 1090F PRES/ABSN URINE INCON ASSESS: CPT | Performed by: INTERNAL MEDICINE

## 2022-02-07 PROCEDURE — 99213 OFFICE O/P EST LOW 20 MIN: CPT | Performed by: INTERNAL MEDICINE

## 2022-02-07 RX ORDER — RANOLAZINE 500 MG/1
500 TABLET, EXTENDED RELEASE ORAL 2 TIMES DAILY
Qty: 60 TABLET | Refills: 3 | Status: SHIPPED | OUTPATIENT
Start: 2022-02-07 | End: 2022-05-24 | Stop reason: SINTOL

## 2022-02-07 RX ORDER — ALPRAZOLAM 0.25 MG/1
0.25 TABLET ORAL NIGHTLY PRN
Qty: 90 TABLET | Refills: 0 | Status: SHIPPED | OUTPATIENT
Start: 2022-02-07 | End: 2022-05-09 | Stop reason: SDUPTHER

## 2022-02-07 RX ORDER — CARVEDILOL 3.12 MG/1
TABLET ORAL
Qty: 180 TABLET | Refills: 0 | Status: SHIPPED | OUTPATIENT
Start: 2022-02-07 | End: 2022-05-09 | Stop reason: SDUPTHER

## 2022-02-07 NOTE — PROGRESS NOTES
Cardiology Consultation/Follow Up. [address]  2/9/1928  [id number]     2/7/22    CC: Patient is here for follow up for Re: SOB/HERNANDEZ    HPI:   Martin Ash is here for follow up for Re: SOB/HERNANDEZ. Still gets some SOB/HERNANDEZ. Inhaler use was not helping. Past Medical:  Past Medical History:   Diagnosis Date    Anxiety     GERD (gastroesophageal reflux disease)     H/O cardiac catheterization 07/2010    Hearing loss     deaf in R ear from treatment for Meniere's disease    Hypertension      Past Surgical:  Past Surgical History:   Procedure Laterality Date    APPENDECTOMY      BREAST BIOPSY Right 07/2005    Dr Farida Donnelly  2010    30% RCA stenosis    CHOLECYSTECTOMY      INNER EAR SURGERY Right 1989    [place]    LAPAROTOMY      MALIGNANT SKIN LESION EXCISION Left 2014    squamous cell carcinoma left 3rd toe, done in office     Family History:  Family History   Problem Relation Age of Onset    Coronary Art Dis Father     Cancer Sister         ovarian    Coronary Art Dis Brother      Social History:  Social History     Tobacco Use    Smoking status: Never Smoker    Smokeless tobacco: Never Used   Vaping Use    Vaping Use: Never used   Substance Use Topics    Alcohol use: No    Drug use: No      REVIEW OF SYSTEMS:    · Constitutional: there has been no unanticipated weight loss. There's been No change in energy level, No change in activity level. · Eyes: No visual changes or diplopia. No scleral icterus. · ENT: No Headaches, hearing loss or vertigo. No mouth sores or sore throat. · Cardiovascular: AS HPI  · Respiratory: AS HPI  · Gastrointestinal: No abdominal pain, appetite loss, blood in stools. No change in bowel or bladder habits. · Genitourinary: No dysuria, trouble voiding, or hematuria. · Musculoskeletal:  No gait disturbance, No weakness or joint complaints. · Integumentary: No rash or pruritis.   · Neurological: No headache, diplopia, change in muscle strength, numbness or tingling. No change in gait, balance, coordination, mood, affect, memory, mentation, behavior. · Psychiatric: No new anxiety or depression. · Endocrine: No temperature intolerance. No excessive thirst, fluid intake, or urination. No tremor. · Hematologic/Lymphatic: No abnormal bruising or bleeding, blood clots or swollen lymph nodes. · Allergic/Immunologic: No nasal congestion or hives. Medications:  Current Outpatient Medications   Medication Sig Dispense Refill    carvedilol (COREG) 3.125 MG tablet take 1 tablet by mouth twice a day with food 180 tablet 0    ALPRAZolam (XANAX) 0.25 MG tablet Take 1 tablet by mouth nightly as needed for Sleep for up to 90 days. 90 tablet 0    Ascorbic Acid (VITAMIN C) 250 MG tablet Take 250 mg by mouth daily      Multiple Vitamins-Minerals (THERAPEUTIC MULTIVITAMIN-MINERALS) tablet Take 1 tablet by mouth daily      aspirin 81 MG chewable tablet Take 81 mg by mouth daily      furosemide (LASIX) 20 MG tablet Take 1 tablet by mouth daily as needed (PRN swelling, weight gain) (Patient not taking: Reported on 11/15/2021) 90 tablet 1    potassium chloride (KLOR-CON M) 10 MEQ extended release tablet Once daily as needed (take with furosemide) (Patient not taking: Reported on 2/7/2022) 60 tablet 0    nitroGLYCERIN (NITROSTAT) 0.4 MG SL tablet Place 1 tablet under the tongue every 5 minutes as needed for Chest pain up to max of 3 total doses. If no relief after 1 dose, call 911. (Patient not taking: Reported on 2/7/2022) 25 tablet 1     No current facility-administered medications for this visit. Physical Exam:   Vitals: BP (!) 133/57 (Site: Left Upper Arm, Position: Sitting, Cuff Size: Medium Adult)   Pulse 80   Ht 5' 6\" (1.676 m)   Wt 146 lb (66.2 kg)   BMI 23.57 kg/m²   General appearance: alert and cooperative with exam  HEENT: Head: Normocephalic, no lesions, without obvious abnormality.   Neck: no carotid bruit, no JVD  Lungs: clear to auscultation bilaterally  Heart: regular rate and rhythm, S1, S2 normal, none Murmur  Abdomen: soft, non-tender; bowel sounds normal; no masses,  no organomegaly  Extremities: no site injection hematoma, extremities normal, atraumatic, no cyanosis. Trace edema  Neurologic: Mental status: Alert, oriented, thought content appropriate    Labs:  Lab Results   Component Value Date    CHOL 174 04/23/2018    TRIG 95 04/23/2018    HDL 62 04/23/2018    LDLCHOLESTEROL 93 04/23/2018    VLDL NOT REPORTED 04/23/2018    CHOLHDLRATIO 2.8 04/23/2018     Lab Results   Component Value Date     11/15/2019    K 4.2 11/15/2019     11/15/2019    CO2 26 11/15/2019    BUN 18 11/15/2019    CREATININE 0.77 11/15/2019    GLUCOSE 106 (H) 11/15/2019    CALCIUM 10.1 11/15/2019    PROT 7.6 04/23/2018    LABALBU 4.0 04/23/2018    BILITOT 0.63 04/23/2018    ALKPHOS 67 04/23/2018    AST 20 04/23/2018    ALT 10 04/23/2018    LABGLOM >60 11/15/2019    GFRAA >60 11/15/2019    GLOB 3.6 06/01/2016     EKG: Sinus  Rhythm   Low voltage in precordial leads.    -Poor R-wave progression     Echo 7/21:  Left ventricular systolic function is normal.  Left ventricular ejection fraction 55 %. Grade I (mild) left ventricular diastolic dysfunction. Aortic valve sclerosis with no stenosis  Mild mitral regurgitation. Normal function of other valves. No pericardial effusion. Past Medical and Surgical History, Problem List, Allergies, Medications, Labs, Imaging, all reviewed extensively in EMR and with the patient.     Assessment:  - SOB- maybe multifactorial- HFpEF, CAD, vs Pulmonary  - Chronic HFpEF with some LE edema.   - HTN  - Non ob CAD on cath in past-   - Preserved LVEF Echo 7/21- with mild MR  - didn't tolerate imdur due to headache    Plan:  - long discussion with patient and family, at this point they would like a more conservative approach and currently dont want a stress test  - continue lasix 20 gm po qd prn  - will try ranexa 500 bid  - recommended Pulm consult- she wants to hold off. She wants to discuss with primary. The patient is to continue heart healthy diet, weight loss and exercise as tolerated. Patient's medications and side effects were discussed. Medication refills were provided if needed. Follow up appointment timing was discussed. All questions and concerns were addressed to patient's satisfaction. The patient is to follow up in 6 month or sooner if necessary. Thank you for allowing me to participate in the care of this patient, please do not hesitate to call if you have any questions. Queen Garcia DO, [facility], [address], [address], [place] [address] [facility]  [facility]. [facility]  [phone]

## 2022-02-07 NOTE — TELEPHONE ENCOUNTER
Mandi Granados is requesting a refill on the following medication(s):  Requested Prescriptions     Pending Prescriptions Disp Refills    carvedilol (COREG) 3.125 MG tablet [Pharmacy Med Name: CARVEDILOL 3.125 MG TABLET] 180 tablet 0     Sig: take 1 tablet by mouth twice a day with food    ALPRAZolam (XANAX) 0.25 MG tablet [Pharmacy Med Name: ALPRAZOLAM 0.25 MG TABLET] 180 tablet      Sig: take 1 tablet by mouth twice a day if needed       Last Visit Date (If Applicable):  95/22/0216    Next Visit Date:    Visit date not found

## 2022-03-08 ENCOUNTER — HOSPITAL ENCOUNTER (OUTPATIENT)
Age: 87
Setting detail: SPECIMEN
Discharge: HOME | End: 2022-03-08
Payer: MEDICARE

## 2022-03-08 ENCOUNTER — OFFICE VISIT (OUTPATIENT)
Dept: FAMILY MEDICINE CLINIC | Age: 87
End: 2022-03-08
Payer: MEDICARE

## 2022-03-08 VITALS
SYSTOLIC BLOOD PRESSURE: 114 MMHG | TEMPERATURE: 97.5 F | HEART RATE: 68 BPM | OXYGEN SATURATION: 96 % | RESPIRATION RATE: 16 BRPM | DIASTOLIC BLOOD PRESSURE: 62 MMHG

## 2022-03-08 DIAGNOSIS — R32 URINARY INCONTINENCE, UNSPECIFIED TYPE: ICD-10-CM

## 2022-03-08 DIAGNOSIS — R32 URINARY INCONTINENCE, UNSPECIFIED TYPE: Primary | ICD-10-CM

## 2022-03-08 LAB
-: ABNORMAL
BACTERIA: ABNORMAL
BILIRUBIN URINE: NEGATIVE
BILIRUBIN, POC: ABNORMAL
BLOOD URINE, POC: ABNORMAL
CLARITY, POC: ABNORMAL
COLOR, POC: YELLOW
EPITHELIAL CELLS UA: ABNORMAL /HPF (ref 0–5)
GLUCOSE URINE, POC: ABNORMAL
GLUCOSE URINE: NEGATIVE
KETONES, POC: ABNORMAL
KETONES, URINE: NEGATIVE
LEUKOCYTE EST, POC: ABNORMAL
LEUKOCYTE ESTERASE, URINE: ABNORMAL
NITRITE, POC: ABNORMAL
NITRITE, URINE: NEGATIVE
OTHER OBSERVATIONS UA: ABNORMAL
PH UA: 6 (ref 5–6)
PH, POC: 5.5
PROTEIN UA: NEGATIVE
PROTEIN, POC: ABNORMAL
RBC UA: ABNORMAL /HPF (ref 0–4)
SPECIFIC GRAVITY UA: 1.02 (ref 1.01–1.02)
SPECIFIC GRAVITY, POC: 1.02
URINE HGB: ABNORMAL
UROBILINOGEN, POC: 0.2
UROBILINOGEN, URINE: NORMAL
WBC UA: ABNORMAL /HPF (ref 0–4)

## 2022-03-08 PROCEDURE — 87086 URINE CULTURE/COLONY COUNT: CPT

## 2022-03-08 PROCEDURE — 1123F ACP DISCUSS/DSCN MKR DOCD: CPT | Performed by: NURSE PRACTITIONER

## 2022-03-08 PROCEDURE — 99213 OFFICE O/P EST LOW 20 MIN: CPT | Performed by: NURSE PRACTITIONER

## 2022-03-08 PROCEDURE — G8420 CALC BMI NORM PARAMETERS: HCPCS | Performed by: NURSE PRACTITIONER

## 2022-03-08 PROCEDURE — PBSHW POCT URINALYSIS DIPSTICK: Performed by: NURSE PRACTITIONER

## 2022-03-08 PROCEDURE — 81002 URINALYSIS NONAUTO W/O SCOPE: CPT | Performed by: NURSE PRACTITIONER

## 2022-03-08 PROCEDURE — 1090F PRES/ABSN URINE INCON ASSESS: CPT | Performed by: NURSE PRACTITIONER

## 2022-03-08 PROCEDURE — 81001 URINALYSIS AUTO W/SCOPE: CPT

## 2022-03-08 PROCEDURE — 1036F TOBACCO NON-USER: CPT | Performed by: NURSE PRACTITIONER

## 2022-03-08 PROCEDURE — 86403 PARTICLE AGGLUT ANTBDY SCRN: CPT

## 2022-03-08 PROCEDURE — G8484 FLU IMMUNIZE NO ADMIN: HCPCS | Performed by: NURSE PRACTITIONER

## 2022-03-08 PROCEDURE — G8427 DOCREV CUR MEDS BY ELIG CLIN: HCPCS | Performed by: NURSE PRACTITIONER

## 2022-03-08 PROCEDURE — 4040F PNEUMOC VAC/ADMIN/RCVD: CPT | Performed by: NURSE PRACTITIONER

## 2022-03-08 PROCEDURE — 0509F URINE INCON PLAN DOCD: CPT | Performed by: NURSE PRACTITIONER

## 2022-03-08 RX ORDER — NITROFURANTOIN 25; 75 MG/1; MG/1
100 CAPSULE ORAL 2 TIMES DAILY
Qty: 14 CAPSULE | Refills: 0 | Status: SHIPPED | OUTPATIENT
Start: 2022-03-08 | End: 2022-03-15

## 2022-03-08 ASSESSMENT — ENCOUNTER SYMPTOMS
SHORTNESS OF BREATH: 0
ABDOMINAL PAIN: 0
COUGH: 0
WHEEZING: 0

## 2022-03-08 NOTE — PROGRESS NOTES
[facility]  [address] [place] [id number]  Dept: [phone]  Dept Fax: [phone]  Loc: [phone]     Visit Date:  3/8/2022    Patient:  Malik Philippe  YOB: 1928    HPI:     Chief Complaint   Patient presents with    Other     Recheck wart on finger- little finger left hand. Patient has not taken the Ranexa last 2 days due to incontinence of bladder. Urine clear, no burning. Urinary frequency   HPI  Bacteria in urine today , no fever, and no dysuria however concerned with 1-2 days of incontinence not normal for her   Medications  Prior to Visit Medications    Medication Sig Taking? Authorizing Provider   carvedilol (COREG) 3.125 MG tablet take 1 tablet by mouth twice a day with food Yes JOHN Campos CNP   ALPRAZolam (XANAX) 0.25 MG tablet Take 1 tablet by mouth nightly as needed for Sleep for up to 90 days. Yes JOHN Campos CNP   Ascorbic Acid (VITAMIN C) 250 MG tablet Take 250 mg by mouth daily Yes Historical Provider, MD   Multiple Vitamins-Minerals (THERAPEUTIC MULTIVITAMIN-MINERALS) tablet Take 1 tablet by mouth daily Yes Historical Provider, MD   aspirin 81 MG chewable tablet Take 81 mg by mouth daily Yes Historical Provider, MD   ranolazine (RANEXA) 500 MG extended release tablet Take 1 tablet by mouth 2 times daily  Patient not taking: Reported on 3/8/2022  Mayur Castañeda DO   furosemide (LASIX) 20 MG tablet Take 1 tablet by mouth daily as needed (PRN swelling, weight gain)  Patient not taking: Reported on 11/15/2021  Mayur Castañeda DO   potassium chloride (KLOR-CON M) 10 MEQ extended release tablet Once daily as needed (take with furosemide)  Patient not taking: Reported on 2/7/2022  Maksim Phlegm, MD   nitroGLYCERIN (NITROSTAT) 0.4 MG SL tablet Place 1 tablet under the tongue every 5 minutes as needed for Chest pain up to max of 3 total doses.  If no relief after 1 dose, call 911. Patient not taking: Reported on 2/7/2022  Crystal Leung MD        The patient is allergic to asa [aspirin], hctz [hydrochlorothiazide], imdur [isosorbide nitrate], lisinopril, and sulfa antibiotics. Past Medical History  Derek Lebron  has a past medical history of Anxiety, GERD (gastroesophageal reflux disease), H/O cardiac catheterization, Hearing loss, and Hypertension. Past Surgical History  The patient  has a past surgical history that includes Cholecystectomy; Breast biopsy (Right, 07/2005); Appendectomy; laparotomy; Cardiac catheterization (2010); malignant skin lesion excision (Left, 2014); and Inner ear surgery (Right, 1989). Family History  This patient's family history includes Cancer in her sister; Coronary Art Dis in her brother and father. Social History  Derek Lebron  reports that she has never smoked. She has never used smokeless tobacco. She reports that she does not drink alcohol and does not use drugs. Health Maintenance:    Health Maintenance   Topic Date Due    DTaP/Tdap/Td vaccine (1 - Tdap) Never done    Shingles Vaccine (2 of 3) 11/26/2012    Annual Wellness Visit (AWV)  Never done    Potassium monitoring  11/15/2020    Creatinine monitoring  11/15/2020    Depression Screen  11/15/2022    Flu vaccine  Completed    Pneumococcal 65+ years Vaccine  Completed    COVID-19 Vaccine  Completed    Hepatitis A vaccine  Aged Out    Hepatitis B vaccine  Aged Out    Hib vaccine  Aged Out    Meningococcal (ACWY) vaccine  Aged Out       Subjective:      Review of Systems   Constitutional: Negative for chills, fatigue and fever. HENT: Negative for congestion. Respiratory: Negative for cough, shortness of breath and wheezing. Cardiovascular: Negative for chest pain, palpitations and leg swelling. Gastrointestinal: Negative for abdominal pain. Genitourinary: Negative for difficulty urinating, frequency and urgency. Musculoskeletal: Negative for arthralgias. Neurological: Negative for dizziness. Psychiatric/Behavioral: Negative for agitation. Objective:     /62 (Site: Left Upper Arm, Position: Sitting, Cuff Size: Large Adult)   Pulse 68   Temp 97.5 °F (36.4 °C)   Resp 16   SpO2 96%     Physical Exam  Vitals and nursing note reviewed. Constitutional:       Appearance: Normal appearance. HENT:      Head: Normocephalic. Cardiovascular:      Rate and Rhythm: Normal rate and regular rhythm. Pulses: Normal pulses. Heart sounds: Normal heart sounds, S1 normal and S2 normal. No murmur heard. Pulmonary:      Effort: Pulmonary effort is normal.      Breath sounds: Normal breath sounds. No wheezing or rales. Abdominal:      General: Bowel sounds are normal.      Palpations: Abdomen is soft. Musculoskeletal:      Right lower leg: No edema. Left lower leg: No edema. Skin:     General: Skin is warm. Neurological:      Mental Status: She is alert. Psychiatric:         Attention and Perception: Attention normal.         Mood and Affect: Mood normal.         Behavior: Behavior normal. Behavior is cooperative. Thought Content: Thought content normal.         Judgment: Judgment normal.         Labs Reviewed 3/8/2022:    Lab Results   Component Value Date    WBC 7.5 11/15/2019    HGB 12.6 11/15/2019    HCT 37.6 11/15/2019     11/15/2019    CHOL 174 04/23/2018    TRIG 95 04/23/2018    HDL 62 04/23/2018    ALT 10 04/23/2018    AST 20 04/23/2018     11/15/2019    K 4.2 11/15/2019     11/15/2019    CREATININE 0.77 11/15/2019    BUN 18 11/15/2019    CO2 26 11/15/2019    INR 1.1 02/06/2019    GLUF 134 (H) 04/23/2018       Assessment/Plan      1. Urinary incontinence, unspecified type  New start antibiotics, call and cordell in 3 days if no better      - POCT Urinalysis no Micro  - Culture, Urine; Future  - Urinalysis with Microscopic; Future      Return in about 6 months (around 9/8/2022).     Patient given educational materials - see patient instructions. Discussed use, benefit, and side effects of prescribed medications. All patient questions answered. Pt voiced understanding. Reviewed health maintenance.        Electronically signed JOHN Whyte CNP on 3/8/2022 at 2:44 PM

## 2022-03-10 LAB
CULTURE: ABNORMAL
SPECIMEN DESCRIPTION: ABNORMAL

## 2022-05-09 DIAGNOSIS — I10 ESSENTIAL HYPERTENSION: ICD-10-CM

## 2022-05-09 DIAGNOSIS — F41.9 ANXIETY DISORDER, UNSPECIFIED TYPE: ICD-10-CM

## 2022-05-09 RX ORDER — CARVEDILOL 3.12 MG/1
TABLET ORAL
Qty: 60 TABLET | Refills: 0 | Status: SHIPPED | OUTPATIENT
Start: 2022-05-09 | End: 2022-05-24 | Stop reason: SDUPTHER

## 2022-05-09 RX ORDER — ALPRAZOLAM 0.25 MG/1
0.25 TABLET ORAL NIGHTLY PRN
Qty: 14 TABLET | Refills: 0 | Status: SHIPPED | OUTPATIENT
Start: 2022-05-09 | End: 2022-05-23

## 2022-05-24 ENCOUNTER — OFFICE VISIT (OUTPATIENT)
Dept: FAMILY MEDICINE CLINIC | Age: 87
End: 2022-05-24
Payer: MEDICARE

## 2022-05-24 ENCOUNTER — TELEPHONE (OUTPATIENT)
Dept: FAMILY MEDICINE CLINIC | Age: 87
End: 2022-05-24

## 2022-05-24 VITALS
OXYGEN SATURATION: 98 % | BODY MASS INDEX: 23.78 KG/M2 | WEIGHT: 148 LBS | SYSTOLIC BLOOD PRESSURE: 124 MMHG | DIASTOLIC BLOOD PRESSURE: 60 MMHG | HEART RATE: 89 BPM | TEMPERATURE: 97.3 F | HEIGHT: 66 IN

## 2022-05-24 DIAGNOSIS — I50.32 CHRONIC DIASTOLIC HEART FAILURE (HCC): ICD-10-CM

## 2022-05-24 DIAGNOSIS — F41.9 ANXIETY DISORDER, UNSPECIFIED TYPE: ICD-10-CM

## 2022-05-24 DIAGNOSIS — I10 ESSENTIAL HYPERTENSION: Primary | ICD-10-CM

## 2022-05-24 PROCEDURE — 1090F PRES/ABSN URINE INCON ASSESS: CPT | Performed by: FAMILY MEDICINE

## 2022-05-24 PROCEDURE — G8420 CALC BMI NORM PARAMETERS: HCPCS | Performed by: FAMILY MEDICINE

## 2022-05-24 PROCEDURE — 99214 OFFICE O/P EST MOD 30 MIN: CPT | Performed by: FAMILY MEDICINE

## 2022-05-24 PROCEDURE — G8427 DOCREV CUR MEDS BY ELIG CLIN: HCPCS | Performed by: FAMILY MEDICINE

## 2022-05-24 PROCEDURE — 99212 OFFICE O/P EST SF 10 MIN: CPT

## 2022-05-24 PROCEDURE — 1036F TOBACCO NON-USER: CPT | Performed by: FAMILY MEDICINE

## 2022-05-24 PROCEDURE — 1123F ACP DISCUSS/DSCN MKR DOCD: CPT | Performed by: FAMILY MEDICINE

## 2022-05-24 RX ORDER — ALPRAZOLAM 0.25 MG/1
0.25 TABLET ORAL NIGHTLY PRN
COMMUNITY
End: 2022-05-24 | Stop reason: SDUPTHER

## 2022-05-24 RX ORDER — ALPRAZOLAM 0.25 MG/1
TABLET ORAL
Qty: 45 TABLET | Refills: 0 | Status: SHIPPED | OUTPATIENT
Start: 2022-05-27 | End: 2022-07-09 | Stop reason: SDUPTHER

## 2022-05-24 RX ORDER — CARVEDILOL 3.12 MG/1
3.12 TABLET ORAL 2 TIMES DAILY
Qty: 180 TABLET | Refills: 1 | Status: SHIPPED | OUTPATIENT
Start: 2022-05-24 | End: 2022-09-26 | Stop reason: SDUPTHER

## 2022-05-24 ASSESSMENT — ENCOUNTER SYMPTOMS
BLOOD IN STOOL: 0
CONSTIPATION: 1
SHORTNESS OF BREATH: 1

## 2022-05-24 NOTE — PROGRESS NOTES
[facility]  [address], [address]  [phone]      Cely Mitchell is a 80 y.o. female who presents today for her medical conditions/complaints as noted below. Cely Mitchell is c/o of Establish Care      HPI:     Pt here today to establish - was most recently seeing Dr. Jeovany August and Francisco Montano. Taking Coreg 3.125 mg BID for HTN - stable. BP well-controlled today - 124/60. Does not check BP at home. Seeing Cardiology (Dr. Angelia Yeung) - next appt on 8/1. Last visit on 2/7 - started on her Ranexa, but this caused urinary incontinence and HA's, so she stopped it. Could not tolerate Imdur in the past as well. Taking ASA 81 mg daily. Has Nitro at home, but has never had to use it. Supposed to take Lasix 20 mg as needed for chronic CHF with mild MR - was instructed to take it only if she gains >2 lbs overnight. Has not had to take often. Has some edema by the end of the day - wears knee-high stockings during the day. Has Klor-con 10 meq tabs to take if she takes the Lasix. Has continued SOB - was recommended to see Pulmonology by Cardio, but she declined for now. Using her late daughter-in-law's oxygen concentrator at home in the mornings to help with her breathing to start her day. Pt takes Xanax as needed for anxiety; states it also helps her \"breathe\". Sometimes she takes 1/2 tab during the day as needed, and then always takes 1 tab before bed. Has been on this for \"years\". Will be having procedure to remove skin cancer on L 5th finger on 6/3 with Dermatology in [place]. Taking MVI + extra Vit C daily. Uses CBD lotion on her knees for mobility/flexibility. Lives at home alone. Has Meals on Wheels daily, and a cleaning lady twice per month. Son does her grocery shopping and comes over approx twice per week to check on her. Has a cane at home and a wheeled walker; also has a shower chair. No h/o tobacco use.   Denies recreational drug use.  Denies alcohol use. Does not follow any specific diet/exercise regimen. Past Medical History:   Diagnosis Date    Anxiety     GERD (gastroesophageal reflux disease)     H/O cardiac catheterization 2010    Hearing loss     deaf in R ear from treatment for Meniere's disease    Hypertension       Past Surgical History:   Procedure Laterality Date    APPENDECTOMY      BREAST BIOPSY Right 2005    Dr Leopold Eglin      30% RCA stenosis    CHOLECYSTECTOMY      INNER EAR SURGERY Right     [facility]    LAPAROTOMY      MALIGNANT SKIN LESION EXCISION Left     SCC toe - ; L pinky finger -      Family History   Problem Relation Age of Onset    Diabetes Mother     Heart Attack Mother     Coronary Art Dis Father          of MI at 52    Ovarian Cancer Sister     Pancreatic Cancer Sister     Diabetes Sister     Other Sister         diphtheria    Other Sister         diphtheria    Coronary Art Dis Brother     Coronary Art Dis Brother     Coronary Art Dis Brother     Coronary Art Dis Brother     Coronary Art Dis Brother     Other Brother         accident     Social History     Tobacco Use    Smoking status: Never Smoker    Smokeless tobacco: Never Used   Substance Use Topics    Alcohol use: No      Current Outpatient Medications   Medication Sig Dispense Refill    ALPRAZolam (XANAX) 0.25 MG tablet Take 1 tab by mouth nightly; may take extra 1/2 tab during the day as needed. 45 tablet 0    carvedilol (COREG) 3.125 MG tablet Take 1 tablet by mouth 2 times daily 180 tablet 1    Ascorbic Acid (VITAMIN C) 250 MG tablet Take 250 mg by mouth daily      Multiple Vitamins-Minerals (THERAPEUTIC MULTIVITAMIN-MINERALS) tablet Take 1 tablet by mouth daily      nitroGLYCERIN (NITROSTAT) 0.4 MG SL tablet Place 1 tablet under the tongue every 5 minutes as needed for Chest pain up to max of 3 total doses.  If no relief after 1 dose, call 532. 39 tablet 1    aspirin 81 MG chewable tablet Take 81 mg by mouth daily      furosemide (LASIX) 20 MG tablet Take 1 tablet by mouth daily as needed (PRN swelling, weight gain) (Patient not taking: Reported on 11/15/2021) 90 tablet 1    potassium chloride (KLOR-CON M) 10 MEQ extended release tablet Once daily as needed (take with furosemide) (Patient not taking: Reported on 2/7/2022) 60 tablet 0     No current facility-administered medications for this visit. Allergies   Allergen Reactions    Asa [Aspirin]      Makes ears ring    Hctz [Hydrochlorothiazide] Hives    Imdur [Isosorbide Nitrate] Diarrhea and Other (See Comments)     Headaches and explosive diarrhea    Lisinopril      intolerant    Sulfa Antibiotics      procardia       Health Maintenance   Topic Date Due    DTaP/Tdap/Td vaccine (1 - Tdap) Never done    Shingles vaccine (2 of 3) 11/26/2012    Depression Screen  05/25/2023    Annual Wellness Visit (AWV)  05/26/2023    Flu vaccine  Completed    Pneumococcal 65+ years Vaccine  Completed    COVID-19 Vaccine  Completed    Hepatitis A vaccine  Aged Out    Hepatitis B vaccine  Aged Out    Hib vaccine  Aged Out    Meningococcal (ACWY) vaccine  Aged Out       Subjective:      Review of Systems   Constitutional: Negative for fever. Respiratory: Positive for shortness of breath (with exertion). Cardiovascular: Negative for chest pain and palpitations. Gastrointestinal: Positive for constipation (intermittent). Negative for blood in stool. Genitourinary: Negative for dysuria and hematuria. Skin: Negative for rash (just dry skin). Neurological: Negative for dizziness and light-headedness. Psychiatric/Behavioral: Negative for dysphoric mood and suicidal ideas. The patient is nervous/anxious (stable).         Objective:     Vitals:    05/24/22 1430   BP: 124/60   Site: Right Upper Arm   Position: Sitting   Cuff Size: Medium Adult   Pulse: 89   Temp: 97.3 °F (36.3 °C)   TempSrc: Temporal SpO2: 98%   Weight: 148 lb (67.1 kg)   Height: 5' 6\" (1.676 m)     Physical Exam  Vitals and nursing note reviewed. Constitutional:       General: She is not in acute distress. Appearance: She is well-developed. HENT:      Head: Normocephalic and atraumatic. Right Ear: Tympanic membrane, ear canal and external ear normal.      Left Ear: Tympanic membrane, ear canal and external ear normal.      Nose: Nose normal.      Mouth/Throat:      Mouth: Mucous membranes are moist.      Pharynx: Oropharynx is clear. No posterior oropharyngeal erythema. Eyes:      Conjunctiva/sclera: Conjunctivae normal.   Cardiovascular:      Rate and Rhythm: Normal rate and regular rhythm. Heart sounds: Normal heart sounds. Pulmonary:      Effort: Pulmonary effort is normal. No respiratory distress. Breath sounds: Normal breath sounds. Abdominal:      General: Bowel sounds are normal. There is no distension. Palpations: Abdomen is soft. Tenderness: There is no abdominal tenderness. Musculoskeletal:      Cervical back: Neck supple. Skin:     General: Skin is warm and dry. Neurological:      Mental Status: She is alert and oriented to person, place, and time. Assessment:      1. Essential hypertension  -     carvedilol (COREG) 3.125 MG tablet; Take 1 tablet by mouth 2 times daily, Disp-180 tablet, R-1Normal  -     Comprehensive Metabolic Panel; Future  -     CBC with Auto Differential; Future  -     TSH With Reflex Ft4; Future  2. Anxiety disorder, unspecified type  -     ALPRAZolam (XANAX) 0.25 MG tablet; Take 1 tab by mouth nightly; may take extra 1/2 tab during the day as needed. , Disp-45 tablet, R-0Normal  3. Chronic diastolic heart failure (HCC)         Plan:      Declined Tdap booster and Shingrix vaccines. Declined all cancer screenings due to age. Return in about 4 months (around 9/24/2022) for f/u anxiety, meds.     Orders Placed This Encounter   Procedures    Comprehensive Metabolic Panel     Standing Status:   Future     Standing Expiration Date:   5/24/2023    CBC with Auto Differential     Standing Status:   Future     Standing Expiration Date:   5/24/2023    TSH With Reflex Ft4     Standing Status:   Future     Standing Expiration Date:   5/24/2023     Orders Placed This Encounter   Medications    ALPRAZolam (XANAX) 0.25 MG tablet     Sig: Take 1 tab by mouth nightly; may take extra 1/2 tab during the day as needed. Dispense:  45 tablet     Refill:  0    carvedilol (COREG) 3.125 MG tablet     Sig: Take 1 tablet by mouth 2 times daily     Dispense:  180 tablet     Refill:  1       Patient given educational materials - see patient instructions. Discussed use, benefit, and side effects of prescribed medications. All patient questions answered. Pt voiced understanding. Reviewed health maintenance.             Electronically signed by Violet Good DO, DO on 5/31/2022 at 11:28 PM

## 2022-05-25 ENCOUNTER — TELEMEDICINE (OUTPATIENT)
Dept: FAMILY MEDICINE CLINIC | Age: 87
End: 2022-05-25
Payer: MEDICARE

## 2022-05-25 DIAGNOSIS — Z00.00 INITIAL MEDICARE ANNUAL WELLNESS VISIT: Primary | ICD-10-CM

## 2022-05-25 PROCEDURE — G0438 PPPS, INITIAL VISIT: HCPCS | Performed by: FAMILY MEDICINE

## 2022-05-25 PROCEDURE — 1123F ACP DISCUSS/DSCN MKR DOCD: CPT | Performed by: FAMILY MEDICINE

## 2022-05-25 SDOH — HEALTH STABILITY: PHYSICAL HEALTH: ON AVERAGE, HOW MANY DAYS PER WEEK DO YOU ENGAGE IN MODERATE TO STRENUOUS EXERCISE (LIKE A BRISK WALK)?: 0 DAYS

## 2022-05-25 ASSESSMENT — PATIENT HEALTH QUESTIONNAIRE - PHQ9
SUM OF ALL RESPONSES TO PHQ QUESTIONS 1-9: 0
2. FEELING DOWN, DEPRESSED OR HOPELESS: 0
3. TROUBLE FALLING OR STAYING ASLEEP: 0
SUM OF ALL RESPONSES TO PHQ QUESTIONS 1-9: 0
7. TROUBLE CONCENTRATING ON THINGS, SUCH AS READING THE NEWSPAPER OR WATCHING TELEVISION: 0
SUM OF ALL RESPONSES TO PHQ QUESTIONS 1-9: 0
SUM OF ALL RESPONSES TO PHQ QUESTIONS 1-9: 0
4. FEELING TIRED OR HAVING LITTLE ENERGY: 0
SUM OF ALL RESPONSES TO PHQ QUESTIONS 1-9: 0
8. MOVING OR SPEAKING SO SLOWLY THAT OTHER PEOPLE COULD HAVE NOTICED. OR THE OPPOSITE, BEING SO FIGETY OR RESTLESS THAT YOU HAVE BEEN MOVING AROUND A LOT MORE THAN USUAL: 0
1. LITTLE INTEREST OR PLEASURE IN DOING THINGS: 0
SUM OF ALL RESPONSES TO PHQ QUESTIONS 1-9: 0
5. POOR APPETITE OR OVEREATING: 0
SUM OF ALL RESPONSES TO PHQ QUESTIONS 1-9: 0
10. IF YOU CHECKED OFF ANY PROBLEMS, HOW DIFFICULT HAVE THESE PROBLEMS MADE IT FOR YOU TO DO YOUR WORK, TAKE CARE OF THINGS AT HOME, OR GET ALONG WITH OTHER PEOPLE: 0
SUM OF ALL RESPONSES TO PHQ9 QUESTIONS 1 & 2: 0
6. FEELING BAD ABOUT YOURSELF - OR THAT YOU ARE A FAILURE OR HAVE LET YOURSELF OR YOUR FAMILY DOWN: 0
9. THOUGHTS THAT YOU WOULD BE BETTER OFF DEAD, OR OF HURTING YOURSELF: 0
SUM OF ALL RESPONSES TO PHQ QUESTIONS 1-9: 0

## 2022-05-25 ASSESSMENT — LIFESTYLE VARIABLES
HOW OFTEN DO YOU HAVE SIX OR MORE DRINKS ON ONE OCCASION: 1
HOW OFTEN DO YOU HAVE A DRINK CONTAINING ALCOHOL: 1
HOW OFTEN DO YOU HAVE A DRINK CONTAINING ALCOHOL: NEVER

## 2022-05-25 ASSESSMENT — COLUMBIA-SUICIDE SEVERITY RATING SCALE - C-SSRS
1. WITHIN THE PAST MONTH, HAVE YOU WISHED YOU WERE DEAD OR WISHED YOU COULD GO TO SLEEP AND NOT WAKE UP?: NO
2. HAVE YOU ACTUALLY HAD ANY THOUGHTS OF KILLING YOURSELF?: NO
6. HAVE YOU EVER DONE ANYTHING, STARTED TO DO ANYTHING, OR PREPARED TO DO ANYTHING TO END YOUR LIFE?: NO

## 2022-05-25 NOTE — PROGRESS NOTES
Medicare Annual Wellness Visit    Joellen Navarro is here for Medicare AWV    Assessment & Plan   Initial Medicare annual wellness visit      Recommendations for Preventive Services Due: see orders and patient instructions/AVS.  Recommended screening schedule for the next 5-10 years is provided to the patient in written form: see Patient Instructions/AVS.     No follow-ups on file. Subjective     Patient's complete Health Risk Assessment and screening values have been reviewed and are found in Flowsheets. The following problems were reviewed today and where indicated follow up appointments were made and/or referrals ordered.     Positive Risk Factor Screenings with Interventions:               General Health and ACP:  General  In general, how would you say your health is?: Fair  In the past 7 days, have you experienced any of the following: New or Increased Pain, New or Increased Fatigue, Loneliness, Social Isolation, Stress or Anger?: No  Do you get the social and emotional support that you need?: Yes  Do you have a Living Will?: (!) No    Advance Directives     Power of [address] [place] [address] Will ACP-Advance Directive ACP-Power of     Not on File Not on File Not on File Not on File      General Health Risk Interventions:  · No Living Will: Patient declines ACP discussion/assistance    Health Habits/Nutrition:     Physical Activity: Unknown    Days of Exercise per Week: 0 days    Minutes of Exercise per Session: Not on file     Have you lost any weight without trying in the past 3 months?: No     Have you seen the dentist within the past year?: (!) No    Health Habits/Nutrition Interventions:  · Inadequate physical activity:  patient is not ready to increase his/her physical activity level at this time  · Dental exam overdue:  patient encouraged to make appointment with his/her dentist    Hearing/Vision:  Do you or your family notice any trouble with your hearing that hasn't been managed with hearing aids?: No  Do you have difficulty driving, watching TV, or doing any of your daily activities because of your eyesight?: No  Have you had an eye exam within the past year?: (!) No  No exam data present    Hearing/Vision Interventions:  · Vision concerns:  patient encouraged to make appointment with his/her eye specialist    Safety:  Do you have working smoke detectors?: (!) No  Do you have any tripping hazards - loose or unsecured carpets or rugs?: No  Do you have any tripping hazards - clutter in doorways, halls, or stairs?: No  Do you have either shower bars, grab bars, non-slip mats or non-slip surfaces in your shower or bathtub?: Yes  Do all of your stairways have a railing or banister?: Not Applicable  Do you always fasten your seatbelt when you are in a car?: Yes    Safety Interventions:  · Error, pt does have working smoke detectors           Objective      Patient-Reported Vitals  No data recorded           Allergies   Allergen Reactions    Asa [Aspirin]      Makes ears ring    Hctz [Hydrochlorothiazide] Hives    Imdur [Isosorbide Nitrate] Diarrhea and Other (See Comments)     Headaches and explosive diarrhea    Lisinopril      intolerant    Sulfa Antibiotics      procardia     Prior to Visit Medications    Medication Sig Taking? Authorizing Provider   ALPDANIELLAZoclem Reed) 0.25 MG tablet Take 1 tab by mouth nightly; may take extra 1/2 tab during the day as needed.   Bessie Robbins Black, DO   carvedilol (COREG) 3.125 MG tablet Take 1 tablet by mouth 2 times daily  Bessie Robbins Black, DO   furosemide (LASIX) 20 MG tablet Take 1 tablet by mouth daily as needed (PRN swelling, weight gain)  Patient not taking: Reported on 11/15/2021  Mayur Castañeda, DO   Ascorbic Acid (VITAMIN C) 250 MG tablet Take 250 mg by mouth daily  Historical Provider, MD   Multiple Vitamins-Minerals (THERAPEUTIC MULTIVITAMIN-MINERALS) tablet Take 1 tablet by mouth daily  Historical Provider, MD   potassium chloride (KLOR-CON M) 10 MEQ extended release tablet Once daily as needed (take with furosemide)  Patient not taking: Reported on 2/7/2022  Annalee Wallace MD   nitroGLYCERIN (NITROSTAT) 0.4 MG SL tablet Place 1 tablet under the tongue every 5 minutes as needed for Chest pain up to max of 3 total doses. If no relief after 1 dose, call 911. Annalee Wallace MD   aspirin 81 MG chewable tablet Take 81 mg by mouth daily  Historical Provider, MD Pérez (Including outside providers/suppliers regularly involved in providing care):   Patient Care Team:  Amy Ricci DO as PCP - General (Family Medicine)  Amy Ricci DO as PCP - [facility][1] [facility]     Reviewed and updated this visit:  Tobacco  Allergies  Meds  Med Hx  Surg Hx  Soc Hx  Fam Hx           [place] Dikes, was evaluated through a synchronous (real-time) telephone encounter. The patient (or guardian if applicable) is aware that this is a billable service, which includes applicable co-pays. This Virtual Visit was conducted with patient's (and/or legal guardian's) consent. The visit was conducted pursuant to the emergency declaration under the [address] [place] [address], [address] [place] [address] authority and the [employer] General Act. Patient identification was verified, and a caregiver was present when appropriate. The patient was located at Home: [facility] [address] [place] [address]. Provider was located at [facility] [address] [place] [address],  [address] [facility] Bobby Dye. This encounter was performed under myAmy DOs, direct supervision, 5/25/2022.

## 2022-05-25 NOTE — PATIENT INSTRUCTIONS
Personalized Preventive Plan for Renee Tellez - 5/25/2022  Medicare offers a range of preventive health benefits. Some of the tests and screenings are paid in full while other may be subject to a deductible, co-insurance, and/or copay. Some of these benefits include a comprehensive review of your medical history including lifestyle, illnesses that may run in your family, and various assessments and screenings as appropriate. After reviewing your medical record and screening and assessments performed today your provider may have ordered immunizations, labs, imaging, and/or referrals for you. A list of these orders (if applicable) as well as your Preventive Care list are included within your After Visit Summary for your review. Other Preventive Recommendations:    · A preventive eye exam performed by an eye specialist is recommended every 1-2 years to screen for glaucoma; cataracts, macular degeneration, and other eye disorders. · A preventive dental visit is recommended every 6 months. · Try to get at least 150 minutes of exercise per week or 10,000 steps per day on a pedometer . · Order or download the FREE \"Exercise & Physical Activity: Your Everyday Guide\" from [employer]. Call 9-692.452.6957 or search [employer] online. · You need 9351-4934 mg of calcium and 7579-4226 IU of vitamin D per day. It is possible to meet your calcium requirement with diet alone, but a vitamin D supplement is usually necessary to meet this goal.  · When exposed to the sun, use a sunscreen that protects against both UVA and UVB radiation with an SPF of 30 or greater. Reapply every 2 to 3 hours or after sweating, drying off with a towel, or swimming. · Always wear a seat belt when traveling in a car. Always wear a helmet when riding a bicycle or motorcycle.

## 2022-06-28 ENCOUNTER — HOSPITAL ENCOUNTER (OUTPATIENT)
Dept: LAB | Age: 87
Discharge: HOME | End: 2022-06-28
Payer: MEDICARE

## 2022-06-28 DIAGNOSIS — I10 ESSENTIAL HYPERTENSION: ICD-10-CM

## 2022-06-28 LAB
ABSOLUTE EOS #: 0.39 K/UL (ref 0–0.44)
ABSOLUTE IMMATURE GRANULOCYTE: <0.03 K/UL (ref 0–0.3)
ABSOLUTE LYMPH #: 2.78 K/UL (ref 1.1–3.7)
ABSOLUTE MONO #: 0.62 K/UL (ref 0.1–1.2)
ALBUMIN SERPL-MCNC: 3.7 G/DL (ref 3.5–5.2)
ALBUMIN/GLOBULIN RATIO: 1 (ref 1–2.5)
ALP BLD-CCNC: 81 U/L (ref 35–104)
ALT SERPL-CCNC: 12 U/L (ref 5–33)
ANION GAP SERPL CALCULATED.3IONS-SCNC: 11 MMOL/L (ref 9–17)
AST SERPL-CCNC: 23 U/L
BASOPHILS # BLD: 1 % (ref 0–2)
BASOPHILS ABSOLUTE: 0.09 K/UL (ref 0–0.2)
BILIRUB SERPL-MCNC: 0.63 MG/DL (ref 0.3–1.2)
BUN BLDV-MCNC: 23 MG/DL (ref 8–23)
BUN/CREAT BLD: 26 (ref 9–20)
CALCIUM SERPL-MCNC: 9.5 MG/DL (ref 8.6–10.4)
CHLORIDE BLD-SCNC: 104 MMOL/L (ref 98–107)
CO2: 24 MMOL/L (ref 20–31)
CREAT SERPL-MCNC: 0.88 MG/DL (ref 0.5–0.9)
EOSINOPHILS RELATIVE PERCENT: 6 % (ref 1–4)
GFR AFRICAN AMERICAN: >60 ML/MIN
GFR NON-AFRICAN AMERICAN: 60 ML/MIN
GFR SERPL CREATININE-BSD FRML MDRD: ABNORMAL ML/MIN/{1.73_M2}
GLUCOSE BLD-MCNC: 106 MG/DL (ref 70–99)
HCT VFR BLD CALC: 36.4 % (ref 36.3–47.1)
HEMOGLOBIN: 12 G/DL (ref 11.9–15.1)
IMMATURE GRANULOCYTES: 0 %
LYMPHOCYTES # BLD: 44 % (ref 24–43)
MCH RBC QN AUTO: 34.3 PG (ref 25.2–33.5)
MCHC RBC AUTO-ENTMCNC: 33 G/DL (ref 25.2–33.5)
MCV RBC AUTO: 104 FL (ref 82.6–102.9)
MONOCYTES # BLD: 10 % (ref 3–12)
NRBC AUTOMATED: 0 PER 100 WBC
PDW BLD-RTO: 13.2 % (ref 11.8–14.4)
PLATELET # BLD: 214 K/UL (ref 138–453)
PMV BLD AUTO: 11.2 FL (ref 8.1–13.5)
POTASSIUM SERPL-SCNC: 4.2 MMOL/L (ref 3.7–5.3)
RBC # BLD: 3.5 M/UL (ref 3.95–5.11)
RBC # BLD: ABNORMAL 10*6/UL
SEG NEUTROPHILS: 39 % (ref 36–65)
SEGMENTED NEUTROPHILS ABSOLUTE COUNT: 2.46 K/UL (ref 1.5–8.1)
SODIUM BLD-SCNC: 139 MMOL/L (ref 135–144)
TOTAL PROTEIN: 7.5 G/DL (ref 6.4–8.3)
TSH SERPL DL<=0.05 MIU/L-ACNC: 3.89 UIU/ML (ref 0.3–5)
WBC # BLD: 6.4 K/UL (ref 3.5–11.3)

## 2022-06-28 PROCEDURE — 36415 COLL VENOUS BLD VENIPUNCTURE: CPT

## 2022-06-28 PROCEDURE — 84443 ASSAY THYROID STIM HORMONE: CPT

## 2022-06-28 PROCEDURE — 80053 COMPREHEN METABOLIC PANEL: CPT

## 2022-06-28 PROCEDURE — 85025 COMPLETE CBC W/AUTO DIFF WBC: CPT

## 2022-07-07 ENCOUNTER — TELEPHONE (OUTPATIENT)
Dept: FAMILY MEDICINE CLINIC | Age: 87
End: 2022-07-07

## 2022-07-07 DIAGNOSIS — F41.9 ANXIETY DISORDER, UNSPECIFIED TYPE: ICD-10-CM

## 2022-07-07 RX ORDER — ALPRAZOLAM 0.25 MG/1
TABLET ORAL
Qty: 45 TABLET | Refills: 0 | Status: CANCELLED | OUTPATIENT
Start: 2022-07-07 | End: 2022-08-06

## 2022-07-07 NOTE — TELEPHONE ENCOUNTER
Per [facility], last fill 5/28/22, quantity 45 for 30 days. Patient also asking for the results on her blood work that was done on 6/28/22. Edith Skelton called requesting a refill of the below medication which has been pended for you:     Requested Prescriptions     Pending Prescriptions Disp Refills    ALPRAZolam (XANAX) 0.25 MG tablet 45 tablet 0     Sig: Take 1 tab by mouth nightly; may take extra 1/2 tab during the day as needed.        Last Appointment Date: 5/25/2022  Next Appointment Date: 9/26/2022    Allergies   Allergen Reactions    Asa [Aspirin]      Makes ears ring    Hctz [Hydrochlorothiazide] Hives    Imdur [Isosorbide Nitrate] Diarrhea and Other (See Comments)     Headaches and explosive diarrhea    Lisinopril      intolerant    Sulfa Antibiotics      procardia

## 2022-07-09 RX ORDER — ALPRAZOLAM 0.25 MG/1
TABLET ORAL
Qty: 45 TABLET | Refills: 0 | Status: SHIPPED | OUTPATIENT
Start: 2022-07-09 | End: 2022-08-17 | Stop reason: SDUPTHER

## 2022-07-09 NOTE — TELEPHONE ENCOUNTER
See result note for call to pt on lab results. Controlled Substance Monitoring:    Acute and Chronic Pain Monitoring:   RX Monitoring 7/9/2022   Attestation -   Periodic Controlled Substance Monitoring No signs of potential drug abuse or diversion identified. Obtained or confirmed \"Medication Contract\" on file.

## 2022-08-01 ENCOUNTER — OFFICE VISIT (OUTPATIENT)
Dept: CARDIOLOGY | Age: 87
End: 2022-08-01
Payer: MEDICARE

## 2022-08-01 VITALS
DIASTOLIC BLOOD PRESSURE: 64 MMHG | HEIGHT: 66 IN | RESPIRATION RATE: 16 BRPM | HEART RATE: 70 BPM | SYSTOLIC BLOOD PRESSURE: 122 MMHG | BODY MASS INDEX: 23.3 KG/M2 | WEIGHT: 145 LBS

## 2022-08-01 DIAGNOSIS — I10 ESSENTIAL HYPERTENSION: Primary | ICD-10-CM

## 2022-08-01 PROCEDURE — G8420 CALC BMI NORM PARAMETERS: HCPCS | Performed by: INTERNAL MEDICINE

## 2022-08-01 PROCEDURE — 1123F ACP DISCUSS/DSCN MKR DOCD: CPT | Performed by: INTERNAL MEDICINE

## 2022-08-01 PROCEDURE — 93005 ELECTROCARDIOGRAM TRACING: CPT | Performed by: INTERNAL MEDICINE

## 2022-08-01 PROCEDURE — 99214 OFFICE O/P EST MOD 30 MIN: CPT | Performed by: INTERNAL MEDICINE

## 2022-08-01 PROCEDURE — 1090F PRES/ABSN URINE INCON ASSESS: CPT | Performed by: INTERNAL MEDICINE

## 2022-08-01 PROCEDURE — 1036F TOBACCO NON-USER: CPT | Performed by: INTERNAL MEDICINE

## 2022-08-01 PROCEDURE — G8427 DOCREV CUR MEDS BY ELIG CLIN: HCPCS | Performed by: INTERNAL MEDICINE

## 2022-08-01 PROCEDURE — 93010 ELECTROCARDIOGRAM REPORT: CPT | Performed by: INTERNAL MEDICINE

## 2022-08-01 NOTE — PROGRESS NOTES
MULTIVITAMIN-MINERALS) tablet Take 1 tablet by mouth daily   Yes Historical Provider, MD   nitroGLYCERIN (NITROSTAT) 0.4 MG SL tablet Place 1 tablet under the tongue every 5 minutes as needed for Chest pain up to max of 3 total doses. If no relief after 1 dose, call 911. 2/8/19  Yes Cordelia Shepherd MD   aspirin 81 MG chewable tablet Take 81 mg by mouth daily   Yes Historical Provider, MD   furosemide (LASIX) 20 MG tablet Take 1 tablet by mouth daily as needed (PRN swelling, weight gain)  Patient not taking: No sig reported 8/2/21   Mayur Castañeda DO   potassium chloride (KLOR-CON M) 10 MEQ extended release tablet Once daily as needed (take with furosemide)  Patient not taking: No sig reported 11/15/19   Cordelia Shepherd MD       Allergies:  Dipika Brill [aspirin], Hctz [hydrochlorothiazide], Imdur [isosorbide nitrate], Lisinopril, and Sulfa antibiotics    Social History:   reports that she has never smoked. She has never used smokeless tobacco. She reports that she does not drink alcohol and does not use drugs. Review of Systems:  Constitutional: there has been no unanticipated weight loss. There's been No change in energy level, No change in activity level. Eyes: No visual changes or diplopia. No scleral icterus. ENT: No Headaches, hearing loss or vertigo. No mouth sores or sore throat. Cardiovascular: As above. Respiratory: No SOB, cough or hemoptysis. Gastrointestinal: No abdominal pain, appetite loss, blood in stools. No change in bowel or bladder habits. Genitourinary: No dysuria, trouble voiding, or hematuria. Musculoskeletal:  No gait disturbance, No weakness or joint complaints. Integumentary: No rash or pruritis. Psychiatric: No anxiety, or depression. Hematologic/Lymphatic: No abnormal bruising or bleeding, blood clots or swollen lymph nodes. Allergic/Immunologic: No nasal congestion or hives.     Physical Exam:  /64 (Site: Left Upper Arm, Position: Sitting, Cuff Size: Medium Adult)   Pulse 70 Resp 16   Ht 5' 6\" (1.676 m)   Wt 145 lb (65.8 kg)   BMI 23.40 kg/m²     Constitutional and General Appearance: alert, cooperative, no distress and appears stated age  [de-identified]: PERRL, no cervical lymphadenopathy. No masses palpable. Normal oral mucosa  Respiratory:  Normal excursion and expansion without use of accessory muscles  Resp Auscultation: Good respiratory effort. No for increased work of breathing. On auscultation: clear to auscultation bilaterally  Cardiovascular: The apical impulse is not displaced  Heart tones are crisp and normal. regular S1 and S2.  Jugular venous pulsation Normal  The carotid upstroke is normal in amplitude and contour without delay or bruit  Peripheral pulses are symmetrical and full   Abdomen:   No masses or tenderness  Bowel sounds present  Extremities:   No Cyanosis or Clubbing   Lower extremity edema: No   Skin: Warm and dry    Cardiac Data:  EKG: NSR loss of R waves    ECHO 7/2/21    Summary  Left ventricular systolic function is normal.  Left ventricular ejection fraction 55 %. Grade I (mild) left ventricular diastolic dysfunction. Aortic valve sclerosis with no stenosis  Mild mitral regurgitation. Normal function of other valves. No pericardial effusion. Labs:     CBC: No results for input(s): WBC, HGB, HCT, PLT in the last 72 hours. BMP: No results for input(s): NA, K, CO2, BUN, CREATININE, LABGLOM, GLUCOSE in the last 72 hours. PT/INR: No results for input(s): PROTIME, INR in the last 72 hours. FASTING LIPID PANEL:  Lab Results   Component Value Date/Time    CHOL 174 04/23/2018 08:21 AM    HDL 62 04/23/2018 08:21 AM    LDLCHOLESTEROL 93 04/23/2018 08:21 AM    TRIG 95 04/23/2018 08:21 AM    CHOLHDLRATIO 2.8 04/23/2018 08:21 AM     LIVER PROFILE:No results for input(s): AST, ALT, LABALBU in the last 72 hours.       IMPRESSION:    Patient Active Problem List   Diagnosis    Anxiety disorder    Essential hypertension    Coronary artery disease    Raynaud phenomenon    Gastroesophageal reflux disease    Diastolic HF (heart failure) (HCC)    Alpha-1-antitrypsin deficiency carrier    Viral wart on finger       RECOMMENDATIONS:   CAD- History of heart cath with minimal disease  NO CHEST PAIN, CONTINUE CURRENT MEDICATIONS      Dyspnea on exertion, multifactorial with her age mostly not that much active. Patient son wants to see pulmonology which I told good thing to do it. Dependent edema , continue compression stocking. Patient take Lasix if needed to    HYPERTENSION- WELL CONTROLLED, WILL CONTINUE CURRENT MEDICATIONS     HYPERLIPIDEMIA- ON STATIN, NEEDS TO WATCH LIPID PROFILE AND LFT'S  Preserved LV systolic function with mild mitral regurg we will continue to follow    Patient is advised to be careful with the fall precautions tripped over    [address] 6 MONTHS, IF ANY SYMPTOM CHANGE PATIENT ADVISED TO GO TO THE EMERGENCY ROOM.           [facility], MD, MD  [facility]           [phone]

## 2022-08-16 DIAGNOSIS — F41.9 ANXIETY DISORDER, UNSPECIFIED TYPE: ICD-10-CM

## 2022-08-16 NOTE — TELEPHONE ENCOUNTER
Per [facility], last fill 7/9, quantity 45 for 30 days. Lucero Plaza called requesting a refill of the below medication which has been pended for you:     Requested Prescriptions     Pending Prescriptions Disp Refills    ALPRAZolam (XANAX) 0.25 MG tablet 45 tablet 0     Sig: Take 1 tab by mouth nightly; may take extra 1/2 tab during the day as needed.        Last Appointment Date: 5/25/2022  Next Appointment Date: 9/26/2022    Allergies   Allergen Reactions    Asa [Aspirin]      Makes ears ring    Hctz [Hydrochlorothiazide] Hives    Imdur [Isosorbide Nitrate] Diarrhea and Other (See Comments)     Headaches and explosive diarrhea    Lisinopril      intolerant    Sulfa Antibiotics      procardia

## 2022-08-17 RX ORDER — ALPRAZOLAM 0.25 MG/1
TABLET ORAL
Qty: 45 TABLET | Refills: 0 | Status: SHIPPED | OUTPATIENT
Start: 2022-08-17 | End: 2022-09-26 | Stop reason: SDUPTHER

## 2022-09-26 ENCOUNTER — OFFICE VISIT (OUTPATIENT)
Dept: FAMILY MEDICINE CLINIC | Age: 87
End: 2022-09-26
Payer: MEDICARE

## 2022-09-26 ENCOUNTER — IMMUNIZATION (OUTPATIENT)
Dept: LAB | Age: 87
End: 2022-09-26
Payer: MEDICARE

## 2022-09-26 VITALS
BODY MASS INDEX: 23.4 KG/M2 | HEIGHT: 66 IN | OXYGEN SATURATION: 98 % | SYSTOLIC BLOOD PRESSURE: 144 MMHG | TEMPERATURE: 97.1 F | HEART RATE: 75 BPM | DIASTOLIC BLOOD PRESSURE: 68 MMHG

## 2022-09-26 DIAGNOSIS — R42 DIZZINESS: ICD-10-CM

## 2022-09-26 DIAGNOSIS — R60.0 BILATERAL LOWER EXTREMITY EDEMA: ICD-10-CM

## 2022-09-26 DIAGNOSIS — Z23 NEED FOR VACCINATION: Primary | ICD-10-CM

## 2022-09-26 DIAGNOSIS — F41.9 ANXIETY DISORDER, UNSPECIFIED TYPE: Primary | ICD-10-CM

## 2022-09-26 DIAGNOSIS — Z23 NEED FOR INFLUENZA VACCINATION: ICD-10-CM

## 2022-09-26 DIAGNOSIS — I10 ESSENTIAL HYPERTENSION: ICD-10-CM

## 2022-09-26 PROCEDURE — G8427 DOCREV CUR MEDS BY ELIG CLIN: HCPCS | Performed by: FAMILY MEDICINE

## 2022-09-26 PROCEDURE — 1090F PRES/ABSN URINE INCON ASSESS: CPT | Performed by: FAMILY MEDICINE

## 2022-09-26 PROCEDURE — 1036F TOBACCO NON-USER: CPT | Performed by: FAMILY MEDICINE

## 2022-09-26 PROCEDURE — 99214 OFFICE O/P EST MOD 30 MIN: CPT | Performed by: FAMILY MEDICINE

## 2022-09-26 PROCEDURE — G8420 CALC BMI NORM PARAMETERS: HCPCS | Performed by: FAMILY MEDICINE

## 2022-09-26 PROCEDURE — 99213 OFFICE O/P EST LOW 20 MIN: CPT | Performed by: FAMILY MEDICINE

## 2022-09-26 PROCEDURE — PBSHW INFLUENZA, AFLURIA QUADV, (AGE 3 YRS+), IM, PF, 0.5ML: Performed by: FAMILY MEDICINE

## 2022-09-26 PROCEDURE — 1123F ACP DISCUSS/DSCN MKR DOCD: CPT | Performed by: FAMILY MEDICINE

## 2022-09-26 PROCEDURE — 90686 IIV4 VACC NO PRSV 0.5 ML IM: CPT

## 2022-09-26 RX ORDER — ALPRAZOLAM 0.25 MG/1
TABLET ORAL
Qty: 45 TABLET | Refills: 0 | Status: SHIPPED | OUTPATIENT
Start: 2022-09-26 | End: 2022-11-03 | Stop reason: SDUPTHER

## 2022-09-26 RX ORDER — ALBUTEROL SULFATE 90 UG/1
2 AEROSOL, METERED RESPIRATORY (INHALATION) EVERY 6 HOURS PRN
COMMUNITY

## 2022-09-26 RX ORDER — MECLIZINE HYDROCHLORIDE 25 MG/1
12.5-25 TABLET ORAL 3 TIMES DAILY PRN
Qty: 10 TABLET | Refills: 0 | Status: SHIPPED | OUTPATIENT
Start: 2022-09-26

## 2022-09-26 RX ORDER — CARVEDILOL 3.12 MG/1
3.12 TABLET ORAL 2 TIMES DAILY
Qty: 180 TABLET | Refills: 3 | Status: SHIPPED | OUTPATIENT
Start: 2022-09-26

## 2022-09-26 ASSESSMENT — ENCOUNTER SYMPTOMS
CONSTIPATION: 0
BLOOD IN STOOL: 0

## 2022-09-26 NOTE — PROGRESS NOTES
JORDAN [facility] [address]  [address], [address] [place]  [phone]      Kristy Cowart is a 80 y.o. female who presents today for her medical conditions/complaints as noted below. Kristy Cowart is c/o of Anxiety      HPI:     Pt here today for follow-up of anxiety and HTN. BP slightly elevated today - 144/68. Pt did just use her Albuterol inhaler about an hour prior to appt  Denies increased pain or salt intake  Taking Coreg 3.125 mg BID for HTN - did take AM dose today  Does not check BP at home    Taking Xanax 0.125 mg (1/2 of a 0.25 mg tab) during the day and 0.25 mg nightly for anxiety - stable; has been on this dose for quite awhile and tolerates well. States it is helping her with her nerves. Taking ASA 81 mg daily. Cardiology referred her to Pulmonology for SOB - will see Dr. Jarrod Arthur on .     Has not to take Lasix recently - uses as needed per Cardio  Wearing b/l knee-high compression stockings daily        Past Medical History:   Diagnosis Date    Anxiety     GERD (gastroesophageal reflux disease)     H/O cardiac catheterization 2010    Hearing loss     deaf in R ear from treatment for Meniere's disease    Hypertension       Past Surgical History:   Procedure Laterality Date    APPENDECTOMY      BREAST BIOPSY Right 2005    Dr David Soto  2010    30% RCA stenosis    CHOLECYSTECTOMY      INNER EAR SURGERY Right     [place]    LAPAROTOMY      MALIGNANT SKIN LESION EXCISION Left     SCC toe - ; L pinky finger -      Family History   Problem Relation Age of Onset    Diabetes Mother     Heart Attack Mother     Coronary Art Dis Father          of MI at 52    Ovarian Cancer Sister     Pancreatic Cancer Sister     Diabetes Sister     Other Sister         diphtheria    Other Sister         diphtheria    Coronary Art Dis Brother     Coronary Art Dis Brother     Coronary Art Dis Brother     Coronary Art Dis Brother Coronary Art Dis Brother     Other Brother         accident     Social History     Tobacco Use    Smoking status: Never    Smokeless tobacco: Never   Substance Use Topics    Alcohol use: No      Current Outpatient Medications   Medication Sig Dispense Refill    albuterol sulfate HFA (PROVENTIL;VENTOLIN;PROAIR) 108 (90 Base) MCG/ACT inhaler Inhale 2 puffs into the lungs every 6 hours as needed for Wheezing      ALPRAZolam (XANAX) 0.25 MG tablet Take 1/2 tab by mouth daily and 1 tab by mouth nightly for anxiety. 45 tablet 0    carvedilol (COREG) 3.125 MG tablet Take 1 tablet by mouth 2 times daily 180 tablet 3    Psyllium (METAMUCIL PO) Take 3 each by mouth daily      meclizine (ANTIVERT) 25 MG tablet Take 0.5-1 tablets by mouth 3 times daily as needed for Dizziness 10 tablet 0    Ascorbic Acid (VITAMIN C) 250 MG tablet Take 250 mg by mouth daily      nitroGLYCERIN (NITROSTAT) 0.4 MG SL tablet Place 1 tablet under the tongue every 5 minutes as needed for Chest pain up to max of 3 total doses. If no relief after 1 dose, call 911. 25 tablet 1    aspirin 81 MG chewable tablet Take 81 mg by mouth daily      furosemide (LASIX) 20 MG tablet Take 1 tablet by mouth daily as needed (PRN swelling, weight gain) (Patient not taking: No sig reported) 90 tablet 1    potassium chloride (KLOR-CON M) 10 MEQ extended release tablet Once daily as needed (take with furosemide) (Patient not taking: No sig reported) 60 tablet 0     No current facility-administered medications for this visit.      Allergies   Allergen Reactions    Asa [Aspirin]      Makes ears ring    Hctz [Hydrochlorothiazide] Hives    Imdur [Isosorbide Nitrate] Diarrhea and Other (See Comments)     Headaches and explosive diarrhea    Lisinopril      intolerant    Sulfa Antibiotics      procardia       Health Maintenance   Topic Date Due    DTaP/Tdap/Td vaccine (1 - Tdap) Never done    Shingles vaccine (2 of 3) 11/26/2012    COVID-19 Vaccine (4 - Booster for Moderna series) 04/01/2022    Depression Screen  05/25/2023    Annual Wellness Visit (AWV)  05/26/2023    Flu vaccine  Completed    Pneumococcal 65+ years Vaccine  Completed    Hepatitis A vaccine  Aged Out    Hepatitis B vaccine  Aged Out    Hib vaccine  Aged Out    Meningococcal (ACWY) vaccine  Aged Out       Subjective:      Review of Systems   Constitutional:  Negative for unexpected weight change (down 3 lbs since her last OV). HENT:  Positive for hearing loss and tinnitus. Negative for ear pain. Cardiovascular:  Positive for leg swelling (mild - by the end of the day). Negative for chest pain and palpitations. Gastrointestinal:  Negative for blood in stool and constipation (Taking Metamucil daily, which has helped). Genitourinary:  Negative for dysuria and hematuria. Neurological:  Positive for dizziness (in the car). Negative for light-headedness. Psychiatric/Behavioral:  Negative for dysphoric mood and suicidal ideas. The patient is nervous/anxious (stable). Objective:     Vitals:    09/26/22 1451   BP: (!) 144/68   Site: Right Upper Arm   Position: Sitting   Cuff Size: Medium Adult   Pulse: 75   Temp: 97.1 °F (36.2 °C)   TempSrc: Temporal   SpO2: 98%   Height: 5' 6\" (1.676 m)     Physical Exam  Constitutional:       General: She is not in acute distress. Appearance: Normal appearance. HENT:      Head: Normocephalic and atraumatic. Eyes:      Conjunctiva/sclera: Conjunctivae normal.   Cardiovascular:      Rate and Rhythm: Normal rate and regular rhythm. Heart sounds: Normal heart sounds. Pulmonary:      Effort: Pulmonary effort is normal. No respiratory distress. Breath sounds: Normal breath sounds. Abdominal:      General: Bowel sounds are normal. There is no distension. Palpations: Abdomen is soft. Tenderness: There is no abdominal tenderness. Musculoskeletal:      Right lower leg: No edema. Left lower leg: No edema. Skin:     General: Skin is warm and dry. Neurological:      General: No focal deficit present. Mental Status: She is alert and oriented to person, place, and time. Psychiatric:         Mood and Affect: Mood normal.       Assessment:      1. Anxiety disorder, unspecified type  -     ALPRAZolam (XANAX) 0.25 MG tablet; Take 1/2 tab by mouth daily and 1 tab by mouth nightly for anxiety. , Disp-45 tablet, R-0Normal  2. Essential hypertension  -     Basic Metabolic Panel; Future  -     carvedilol (COREG) 3.125 MG tablet; Take 1 tablet by mouth 2 times daily, Disp-180 tablet, R-3Normal  -     Lipid Panel; Future  3. Dizziness  -     meclizine (ANTIVERT) 25 MG tablet; Take 0.5-1 tablets by mouth 3 times daily as needed for Dizziness, Disp-10 tablet, R-0Normal  4. Bilateral lower extremity edema  5. Need for influenza vaccination  -     Influenza, AFLURIA, (age 1 y+), IM, Preservative Free, 0.5 mL         Plan:      Declined any further Covid boosters in the future. Return in about 6 months (around 3/26/2023) for f/u anxiety, HTN. Orders Placed This Encounter   Procedures    Influenza, AFLURIA, (age 1 y+), IM, Preservative Free, 0.5 mL    Basic Metabolic Panel     Standing Status:   Future     Standing Expiration Date:   9/26/2023    Lipid Panel     Standing Status:   Future     Standing Expiration Date:   9/26/2023     Order Specific Question:   Is Patient Fasting?/# of Hours     Answer:   12     Orders Placed This Encounter   Medications    ALPRAZolam (XANAX) 0.25 MG tablet     Sig: Take 1/2 tab by mouth daily and 1 tab by mouth nightly for anxiety. Dispense:  45 tablet     Refill:  0    carvedilol (COREG) 3.125 MG tablet     Sig: Take 1 tablet by mouth 2 times daily     Dispense:  180 tablet     Refill:  3    meclizine (ANTIVERT) 25 MG tablet     Sig: Take 0.5-1 tablets by mouth 3 times daily as needed for Dizziness     Dispense:  10 tablet     Refill:  0       Patient given educational materials - see patient instructions.   Discussed use, benefit, and side effects of prescribed medications. All patient questions answered. Pt voiced understanding. Reviewed health maintenance.             Electronically signed by Tita Sorto DO, DO on 9/30/2022 at 7:16 PM

## 2022-11-02 DIAGNOSIS — F41.9 ANXIETY DISORDER, UNSPECIFIED TYPE: ICD-10-CM

## 2022-11-02 NOTE — TELEPHONE ENCOUNTER
Madina Gibbs called requesting a refill of the below medication which has been pended for you:     Requested Prescriptions     Pending Prescriptions Disp Refills    ALPRAZolam (XANAX) 0.25 MG tablet 45 tablet 0     Sig: Take 1/2 tab by mouth daily and 1 tab by mouth nightly for anxiety.        Last Appointment Date: 9/26/2022  Next Appointment Date: 3/27/2023    Allergies   Allergen Reactions    Asa [Aspirin]      Makes ears ring    Hctz [Hydrochlorothiazide] Hives    Imdur [Isosorbide Nitrate] Diarrhea and Other (See Comments)     Headaches and explosive diarrhea    Lisinopril      intolerant    Sulfa Antibiotics      procardia

## 2022-11-03 DIAGNOSIS — R06.00 DYSPNEA, UNSPECIFIED TYPE: Primary | ICD-10-CM

## 2022-11-03 RX ORDER — ALPRAZOLAM 0.25 MG/1
TABLET ORAL
Qty: 45 TABLET | Refills: 0 | Status: SHIPPED | OUTPATIENT
Start: 2022-11-03 | End: 2022-11-29

## 2022-11-03 NOTE — TELEPHONE ENCOUNTER
Controlled Substance Monitoring:    Acute and Chronic Pain Monitoring:   RX Monitoring 11/3/2022   Attestation -   Periodic Controlled Substance Monitoring No signs of potential drug abuse or diversion identified. Obtained or confirmed \"Medication Contract\" on file.

## 2022-11-09 ENCOUNTER — OFFICE VISIT (OUTPATIENT)
Dept: PULMONOLOGY | Age: 87
End: 2022-11-09
Payer: MEDICARE

## 2022-11-09 ENCOUNTER — HOSPITAL ENCOUNTER (OUTPATIENT)
Dept: PULMONOLOGY | Age: 87
Discharge: HOME | End: 2022-11-09
Payer: MEDICARE

## 2022-11-09 VITALS
WEIGHT: 145 LBS | HEART RATE: 74 BPM | DIASTOLIC BLOOD PRESSURE: 68 MMHG | BODY MASS INDEX: 23.3 KG/M2 | OXYGEN SATURATION: 96 % | HEIGHT: 66 IN | SYSTOLIC BLOOD PRESSURE: 114 MMHG | TEMPERATURE: 98.1 F

## 2022-11-09 DIAGNOSIS — J84.9 ILD (INTERSTITIAL LUNG DISEASE) (HCC): Primary | ICD-10-CM

## 2022-11-09 DIAGNOSIS — R06.00 DYSPNEA, UNSPECIFIED TYPE: ICD-10-CM

## 2022-11-09 DIAGNOSIS — I50.32 CHRONIC DIASTOLIC HEART FAILURE (HCC): ICD-10-CM

## 2022-11-09 DIAGNOSIS — J84.112 IPF (IDIOPATHIC PULMONARY FIBROSIS) (HCC): ICD-10-CM

## 2022-11-09 DIAGNOSIS — E88.01 HETEROZYGOUS ALPHA 1-ANTITRYPSIN DEFICIENCY (HCC): ICD-10-CM

## 2022-11-09 LAB
DLCO %PRED: NORMAL
DLCO PRED: NORMAL
DLCO/VA %PRED: NORMAL
DLCO/VA PRED: NORMAL
DLCO/VA: NORMAL
DLCO: NORMAL
EXPIRATORY TIME-POST: NORMAL
EXPIRATORY TIME: NORMAL
FEF 25-75% %CHNG: NORMAL
FEF 25-75% %PRED-POST: NORMAL
FEF 25-75% %PRED-PRE: NORMAL
FEF 25-75% PRED: NORMAL
FEF 25-75%-POST: NORMAL
FEF 25-75%-PRE: NORMAL
FEV1 %PRED-POST: NORMAL
FEV1 %PRED-PRE: NORMAL
FEV1 PRED: NORMAL
FEV1-POST: NORMAL
FEV1-PRE: NORMAL
FEV1/FVC %PRED-POST: NORMAL
FEV1/FVC %PRED-PRE: NORMAL
FEV1/FVC PRED: NORMAL
FEV1/FVC-POST: NORMAL
FEV1/FVC-PRE: NORMAL
FVC %PRED-POST: NORMAL
FVC %PRED-PRE: NORMAL
FVC PRED: NORMAL
FVC-POST: NORMAL
FVC-PRE: NORMAL
GAW %PRED: NORMAL
GAW PRED: NORMAL
GAW: NORMAL
IC %PRED: NORMAL
IC PRED: NORMAL
IC: NORMAL
MEP: NORMAL
MIP: NORMAL
MVV %PRED-PRE: NORMAL
MVV PRED: NORMAL
MVV-PRE: NORMAL
PEF %PRED-POST: NORMAL
PEF %PRED-PRE: NORMAL
PEF PRED: NORMAL
PEF%CHNG: NORMAL
PEF-POST: NORMAL
PEF-PRE: NORMAL
RAW %PRED: NORMAL
RAW PRED: NORMAL
RAW: NORMAL
RV %PRED: NORMAL
RV PRED: NORMAL
RV: NORMAL
SVC %PRED: NORMAL
SVC PRED: NORMAL
SVC: NORMAL
TLC %PRED: NORMAL
TLC PRED: NORMAL
TLC: NORMAL
VA %PRED: NORMAL
VA PRED: NORMAL
VA: NORMAL
VTG %PRED: NORMAL
VTG PRED: NORMAL
VTG: NORMAL

## 2022-11-09 PROCEDURE — G8427 DOCREV CUR MEDS BY ELIG CLIN: HCPCS | Performed by: INTERNAL MEDICINE

## 2022-11-09 PROCEDURE — 94640 AIRWAY INHALATION TREATMENT: CPT

## 2022-11-09 PROCEDURE — 1090F PRES/ABSN URINE INCON ASSESS: CPT | Performed by: INTERNAL MEDICINE

## 2022-11-09 PROCEDURE — 1036F TOBACCO NON-USER: CPT | Performed by: INTERNAL MEDICINE

## 2022-11-09 PROCEDURE — 99214 OFFICE O/P EST MOD 30 MIN: CPT | Performed by: INTERNAL MEDICINE

## 2022-11-09 PROCEDURE — 6370000000 HC RX 637 (ALT 250 FOR IP): Performed by: INTERNAL MEDICINE

## 2022-11-09 PROCEDURE — 94726 PLETHYSMOGRAPHY LUNG VOLUMES: CPT

## 2022-11-09 PROCEDURE — G8420 CALC BMI NORM PARAMETERS: HCPCS | Performed by: INTERNAL MEDICINE

## 2022-11-09 PROCEDURE — 99204 OFFICE O/P NEW MOD 45 MIN: CPT | Performed by: INTERNAL MEDICINE

## 2022-11-09 PROCEDURE — G8482 FLU IMMUNIZE ORDER/ADMIN: HCPCS | Performed by: INTERNAL MEDICINE

## 2022-11-09 PROCEDURE — 94729 DIFFUSING CAPACITY: CPT

## 2022-11-09 PROCEDURE — 94060 EVALUATION OF WHEEZING: CPT

## 2022-11-09 PROCEDURE — 1123F ACP DISCUSS/DSCN MKR DOCD: CPT | Performed by: INTERNAL MEDICINE

## 2022-11-09 RX ORDER — ALBUTEROL SULFATE 90 UG/1
4 AEROSOL, METERED RESPIRATORY (INHALATION) ONCE
Status: COMPLETED | OUTPATIENT
Start: 2022-11-09 | End: 2022-11-09

## 2022-11-09 RX ADMIN — ALBUTEROL SULFATE 4 PUFF: 90 AEROSOL, METERED RESPIRATORY (INHALATION) at 13:16

## 2022-11-09 ASSESSMENT — ENCOUNTER SYMPTOMS
COUGH: 1
SHORTNESS OF BREATH: 1
EYES NEGATIVE: 1
GASTROINTESTINAL NEGATIVE: 1

## 2022-11-09 NOTE — PROGRESS NOTES
Subjective:      Patient ID: Alexander Calderón is a 80 y.o. female being seen in my clinic for   Chief Complaint   Patient presents with    Shortness of Breath     New patient referred by cardiology short of breath with clear productive cough, symptoms mostly in the morning, getting worse over the past year       HPI  New patient visit, referred by Dr. Brittany Stanford, for evaluation of progressive exertional dyspnea. Patient is accompanied by her son. Patient reports increased shortness of breath over the last 2 years. States that she used to ride her stationary bike. Unfortunately she is no longer able to do that because of shortness of breath. Ambulates with a cane and currently in a wheelchair but maintains that her difficulty with mobility is mostly related to shortness of breath \"I just went on Advair. \"  Also reports a dry, hacking cough. Retired nurse. About 3 years ago she had a CT angiogram of the chest at [facility]. Imaging reviewed. No vascular filling defects however subpleural fibrosis with small areas of honeycombing. No groundglass opacities. Upper lobe greater than lower lobe predominance. No imaging since then. Patient had pulmonary function studies done today which demonstrate a mild restrictive defect of the intrapulmonic type. FVC 1.56 L (66%), FEV1 1.46 L (85%), FEV1/FVC 94%. No significant bronchospastic component is identified. Static lung volumes demonstrate moderate reductions in thoracic gas volume, RV, and TLC. RV 1.51 L (54%), TLC 3.1 L (57%), RV/TLC 49%. Diffusing capacity decreased at 12.43 (46%), airways resistance normal.  Flow volume loop suggests suboptimal effort. Patient denies symptoms of a connective tissue disease. Denies ionizing radiation to her chest.  On no medications known to provoke fibrosis. No history of asthma or allergies. Never smoked. No occupational exposures. No family history of interstitial lung disease.   Of note, the patient has the PiMZ phenotype for alpha 1 antitrypsin. No level is available in the chart. Significance of this not clear, especially given patient's non-smoking status. Patient had an echocardiogram about a year ago. Demonstrated grade 1 diastolic dysfunction otherwise normal systolic function without valvular abnormality. Patient uses albuterol but does not seem to help. Patient has received her COVID vaccination but not the most recent omicron booster. Received flu shot. Current Outpatient Medications   Medication Sig Dispense Refill    ALPRAZolam (XANAX) 0.25 MG tablet Take 1/2 tab by mouth daily and 1 tab by mouth nightly for anxiety. 45 tablet 0    albuterol sulfate HFA (PROVENTIL;VENTOLIN;PROAIR) 108 (90 Base) MCG/ACT inhaler Inhale 2 puffs into the lungs every 6 hours as needed for Wheezing      carvedilol (COREG) 3.125 MG tablet Take 1 tablet by mouth 2 times daily 180 tablet 3    Psyllium (METAMUCIL PO) Take 3 each by mouth daily      meclizine (ANTIVERT) 25 MG tablet Take 0.5-1 tablets by mouth 3 times daily as needed for Dizziness 10 tablet 0    Ascorbic Acid (VITAMIN C) 250 MG tablet Take 250 mg by mouth daily      nitroGLYCERIN (NITROSTAT) 0.4 MG SL tablet Place 1 tablet under the tongue every 5 minutes as needed for Chest pain up to max of 3 total doses. If no relief after 1 dose, call 911. 25 tablet 1    aspirin 81 MG chewable tablet Take 81 mg by mouth daily      furosemide (LASIX) 20 MG tablet Take 1 tablet by mouth daily as needed (PRN swelling, weight gain) (Patient not taking: No sig reported) 90 tablet 1    potassium chloride (KLOR-CON M) 10 MEQ extended release tablet Once daily as needed (take with furosemide) (Patient not taking: No sig reported) 60 tablet 0     No current facility-administered medications for this visit.        Family History   Problem Relation Age of Onset    Diabetes Mother     Heart Attack Mother     Coronary Art Dis Father          of MI at 52    Ovarian Cancer Sister Pancreatic Cancer Sister     Diabetes Sister     Other Sister         diphtheria    Other Sister         diphtheria    Coronary Art Dis Brother     Coronary Art Dis Brother     Coronary Art Dis Brother     Coronary Art Dis Brother     Coronary Art Dis Brother     Other Brother         accident       Social History     Tobacco Use    Smoking status: Never    Smokeless tobacco: Never   Vaping Use    Vaping Use: Never used   Substance Use Topics    Alcohol use: No    Drug use: No       Review of Systems   Constitutional: Negative. HENT: Negative. Eyes: Negative. Respiratory:  Positive for cough and shortness of breath. Cardiovascular: Negative. Gastrointestinal: Negative. All other systems reviewed and are negative. Objective:     Vitals:    11/09/22 1339   BP: 114/68   Site: Right Upper Arm   Position: Sitting   Cuff Size: Medium Adult   Pulse: 74   Temp: 98.1 °F (36.7 °C)   SpO2: 96%   Weight: 145 lb (65.8 kg)   Height: 5' 6\" (1.676 m)     Physical Exam  Vitals and nursing note reviewed. Constitutional:       Appearance: She is well-developed. Comments: Wheelchair-bound. HENT:      Head: Normocephalic. Nose: Nose normal. No congestion. Mouth/Throat:      Mouth: Mucous membranes are moist.      Pharynx: Oropharynx is clear. No oropharyngeal exudate. Eyes:      General: No scleral icterus. Conjunctiva/sclera: Conjunctivae normal.   Neck:      Thyroid: No thyromegaly. Vascular: No JVD. Trachea: No tracheal deviation. Cardiovascular:      Rate and Rhythm: Normal rate and regular rhythm. Heart sounds: Normal heart sounds. No murmur heard. No gallop. Pulmonary:      Effort: Pulmonary effort is normal. No respiratory distress. Breath sounds: No wheezing or rales. Comments: Specifically, no crackles. Chest:      Chest wall: No tenderness. Abdominal:      Palpations: Abdomen is soft. Tenderness: There is no abdominal tenderness. Musculoskeletal:      Cervical back: Neck supple. Right lower leg: No edema. Left lower leg: No edema. Comments: No clubbing. No digital ulcerations. Lymphadenopathy:      Cervical: No cervical adenopathy. Skin:     General: Skin is warm and dry. Neurological:      Mental Status: She is alert and oriented to person, place, and time.      Wt Readings from Last 3 Encounters:   11/09/22 145 lb (65.8 kg)   08/01/22 145 lb (65.8 kg)   05/24/22 148 lb (67.1 kg)     Results for orders placed or performed during the hospital encounter of 06/28/22   TSH With Reflex Ft4   Result Value Ref Range    TSH 3.89 0.30 - 5.00 uIU/mL   CBC with Auto Differential   Result Value Ref Range    WBC 6.4 3.5 - 11.3 k/uL    RBC 3.50 (L) 3.95 - 5.11 m/uL    Hemoglobin 12.0 11.9 - 15.1 g/dL    Hematocrit 36.4 36.3 - 47.1 %    .0 (H) 82.6 - 102.9 fL    MCH 34.3 (H) 25.2 - 33.5 pg    MCHC 33.0 25.2 - 33.5 g/dL    RDW 13.2 11.8 - 14.4 %    Platelets 883 919 - 757 k/uL    MPV 11.2 8.1 - 13.5 fL    NRBC Automated 0.0 0.0 per 100 WBC    Seg Neutrophils 39 36 - 65 %    Lymphocytes 44 (H) 24 - 43 %    Monocytes 10 3 - 12 %    Eosinophils % 6 (H) 1 - 4 %    Basophils 1 0 - 2 %    Immature Granulocytes 0 0 %    Segs Absolute 2.46 1.50 - 8.10 k/uL    Absolute Lymph # 2.78 1.10 - 3.70 k/uL    Absolute Mono # 0.62 0.10 - 1.20 k/uL    Absolute Eos # 0.39 0.00 - 0.44 k/uL    Basophils Absolute 0.09 0.00 - 0.20 k/uL    Absolute Immature Granulocyte <0.03 0.00 - 0.30 k/uL    RBC Morphology MACROCYTOSIS PRESENT    Comprehensive Metabolic Panel   Result Value Ref Range    Glucose 106 (H) 70 - 99 mg/dL    BUN 23 8 - 23 mg/dL    Creatinine 0.88 0.50 - 0.90 mg/dL    Bun/Cre Ratio 26 (H) 9 - 20    Calcium 9.5 8.6 - 10.4 mg/dL    Sodium 139 135 - 144 mmol/L    Potassium 4.2 3.7 - 5.3 mmol/L    Chloride 104 98 - 107 mmol/L    CO2 24 20 - 31 mmol/L    Anion Gap 11 9 - 17 mmol/L    Alkaline Phosphatase 81 35 - 104 U/L    ALT 12 5 - 33 U/L AST 23 <32 U/L    Total Bilirubin 0.63 0.3 - 1.2 mg/dL    Total Protein 7.5 6.4 - 8.3 g/dL    Albumin 3.7 3.5 - 5.2 g/dL    Albumin/Globulin Ratio 1.0 1.0 - 2.5    GFR Non-African American 60 (L) >60 mL/min    GFR African American >60 >60 mL/min    GFR Comment             Assessment:      1. ILD (interstitial lung disease) ([facility] [address]    2. IPF (idiopathic pulmonary fibrosis) ([facility] [facility]    3. Dyspnea, unspecified type    4. Heterozygous alpha 1-antitrypsin deficiency ([facility] [facility]    5. Chronic diastolic heart failure [facility])      Patient Active Problem List   Diagnosis    Anxiety disorder    Essential hypertension    Coronary artery disease    Raynaud phenomenon    Gastroesophageal reflux disease    Diastolic HF (heart failure) (HCC)    Alpha-1-antitrypsin deficiency carrier    Viral wart on finger         Plan:      Shortness of breath likely multifactorial although presence of subpleural fibrosis on CT scan done 3 years ago raises concern for IPF/ILD. Discussed with patient and son. High-resolution CT scan of the chest.  Pending above further evaluation although treatment options very limited. Encouraged omicron booster. Best supportive care. Follow-up after above. No orders of the defined types were placed in this encounter. Orders Placed This Encounter   Procedures    CT CHEST HIGH RESOLUTION     Standing Status:   Future     Standing Expiration Date:   11/9/2023     Order Specific Question:   Reason for exam:     Answer:   progressive HERNANDEZ with evidence for subpleural fibrosis on 2019 CTA; interval change     Return for After testing complete.      Electronically signed by Geri Rene DO on 11/9/2022 at 2:22 PM

## 2022-11-10 NOTE — PROCEDURES
[address]                 [address] [place] [id number]                               PULMONARY FUNCTION    PATIENT NAME: Bel Munoz                       :        1928  MED REC NO:   [id number]                             ROOM:  ACCOUNT NO:   [de-identified]                           ADMIT DATE: 2022  PROVIDER:     Jessica Oakley    DATE OF PROCEDURE:  2022    INTERPRETATION:  Spirometry demonstrates a mild restrictive ventilatory  defect. No significant bronchospastic component is identified. FVC  1.56 liters (66%), FEV1 1.46 liters (85%), FEV1/FVC 94%. Static lung  volumes demonstrate moderate reductions in thoracic gas volume, RV, and  TLC. RV 1.51 liters (54%), TLC 3.10 liters (57%), RV/TLC 49%. Diffusion capacity is markedly decreased. DLCO 12.48 (46%), but when  corrected for alveolar volume, is normal.  Airways resistance is normal.    IMPRESSION:  This study is most consistent with a restrictive defect of  the extrapulmonic type and clinical correlation is required.         Citlali Novoa    D: 2022 22:34:52       T: 2022 22:37:11     [email or web address]  Job#: [id number]     Doc#: [id number]    CC:  Liu Mcmahan

## 2022-11-17 ENCOUNTER — HOSPITAL ENCOUNTER (OUTPATIENT)
Dept: CT IMAGING | Age: 87
Discharge: HOME | End: 2022-11-19
Payer: MEDICARE

## 2022-11-17 DIAGNOSIS — J84.112 IPF (IDIOPATHIC PULMONARY FIBROSIS) (HCC): ICD-10-CM

## 2022-11-17 DIAGNOSIS — J84.9 ILD (INTERSTITIAL LUNG DISEASE) (HCC): ICD-10-CM

## 2022-11-17 PROCEDURE — 71250 CT THORAX DX C-: CPT

## 2022-11-29 NOTE — PROGRESS NOTES
Subjective:      Patient ID: Alexander Calderón is a 80 y.o. female. HPI  Patient here for follow-up for shortness of breath. Last seen in office per Dr. Radha Quiñones in November 2022    CT imaging with patient and family member. Discussed options such as Ofev and side effects of medications. At this time patient does not want to take any extra medications. 6-minute walk test was completed today, patient maintained an SPO2 of 96% or higher did not qualify for supplemental oxygen. Medications:   Albuterol HFA    PRIOR WORKUP:  PFT:  6-minute walk test 12/14/2022: Patient maintained an SPO2 of 66% or higher    PFT 11/9/22: Spirometry demonstrates a mild restrictive ventilatory defect. No significant bronchospastic component is identified. FVC 1.56 liters (66%), FEV1 1.46 liters (85%), FEV1/FVC 94%. Static lung volumes demonstrate moderate reductions in thoracic gas volume, RV, and TLC. RV 1.51 liters (54%), TLC 3.10 liters (57%), RV/TLC 49%. Diffusion capacity is markedly decreased. DLCO 12.48 (46%), but when corrected for alveolar volume, is normal.  Airways resistance is normal.    CT Imaging:  CT High Resolution 11/9/22: Extensive pleural-parenchymal scarring. Extensive pleural fibrosis with interlobular septal thickening and subpleural reticulation. Honeycombing in the lung bases in the subpleural regions with mild bibasilar bronchiectasis present UIP versus NSIP. Progression of interstitial fibrosis compared to prior exam.  No suspicious pulmonary nodules. No pleural effusion or pneumothorax. CT chest 2/6/2019 for right) excavated. No mediastinal adenopathy. Subpleural reticular is noted with an upper lobe predominance. Apical pleural thickening is noted without appreciable change. No consolidation or effusion.           Sleep Study:    Laboratory Evaluation:    Immunizations:   Immunization History   Administered Date(s) Administered    COVID-19, MODERNA BLUE border, Primary or Immunocompromised, (age 12y+), IM, 100 mcg/0.5mL 2021, 2021, 2021    Influenza Vaccine, unspecified formulation 11/15/2016    Influenza, AFLURIA (age 1 yrs+), FLUZONE, (age 10 mo+), MDV, 0.5mL 10/02/2017    Influenza, FLUARIX, FLULAVAL, FLUZONE (age 10 mo+) AND AFLURIA, (age 1 y+), PF, 0.5mL 2019, 2021, 2022    Influenza, FLUZONE (age 72 y+), High Dose, 0.7mL 10/13/2020    Influenza, High Dose (Fluzone 65 yrs and older) 10/05/2018, 2019, 10/13/2020    Pneumococcal Conjugate 13-valent ([id number]) 2015    Pneumococcal Polysaccharide ([id number]) 2004, 11/15/2019    Zoster Live (Zostavax) 10/01/2012        No flowsheet data found. There were no vitals taken for this visit. Past Medical History:   Diagnosis Date    Anxiety     GERD (gastroesophageal reflux disease)     H/O cardiac catheterization 2010    Hearing loss     deaf in R ear from treatment for Meniere's disease    Hypertension      Past Surgical History:   Procedure Laterality Date    APPENDECTOMY      BREAST BIOPSY Right 2005    Dr Dano Rivas  2010    30% RCA stenosis    CHOLECYSTECTOMY      INNER EAR SURGERY Right     [place]    LAPAROTOMY      MALIGNANT SKIN LESION EXCISION Left     SCC toe - ; L pinky finger -      Family History   Problem Relation Age of Onset    Diabetes Mother     Heart Attack Mother     Coronary Art Dis Father          of MI at 52    Ovarian Cancer Sister     Pancreatic Cancer Sister     Diabetes Sister     Other Sister         diphtheria    Other Sister         diphtheria    Coronary Art Dis Brother     Coronary Art Dis Brother     Coronary Art Dis Brother     Coronary Art Dis Brother     Coronary Art Dis Brother     Other Brother         accident       Social History     Socioeconomic History    Marital status:       Spouse name: Not on file    Number of children: Not on file    Years of education: Not on file    Highest education level: Not on file   Occupational History    Not on file   Tobacco Use    Smoking status: Never    Smokeless tobacco: Never   Vaping Use    Vaping Use: Never used   Substance and Sexual Activity    Alcohol use: No    Drug use: No    Sexual activity: Not on file   Other Topics Concern    Not on file   Social History Narrative    Not on file     Social Determinants of Health     Financial Resource Strain: Not on file   Food Insecurity: Not on file   Transportation Needs: Not on file   Physical Activity: Unknown    Days of Exercise per Week: 0 days    Minutes of Exercise per Session: Not on file   Stress: Not on file   Social Connections: Not on file   Intimate Partner Violence: Not on file   Housing Stability: Not on file       Review of Systems    Objective:       Physical Exam  General appearance - {:963067}  Mental status - {:282073}  Eyes - {:524863}  Ears -not examined  Nose - {:693763}  Mouth - {:291421}  Neck - {:118148}  Chest - {:746787}  Heart -{:961274}  Abdomen - {:436158}  Neuro- {:751284}}  Extremities - {:006147}  Skin - {:812747}     Wt Readings from Last 3 Encounters:   11/09/22 145 lb (65.8 kg)   08/01/22 145 lb (65.8 kg)   05/24/22 148 lb (67.1 kg)       Results for orders placed or performed during the hospital encounter of 11/09/22   Full PFT Study With Bronchodilator   Result Value Ref Range    FVC-Pre      FVC Pred      FVC %Pred-Pre      PVC-Post      FVC %Pred-Post      FEV1-Pre      FEV1 Pred      FEV1 %Pred-Pre      FEV1-Post      FEV1 %Pred-Post      FEV1/FVC-Pre      FEV1/FVC Pred      FEV1/FVC %Pred-Pre      FEV1/FVC-Post      FEV1/FVC %Pred-Post      FEF 25-75%-Pre      FEF 25-75% Pred      FEF 25-75% %Pred-Pre      FEF 25-75%-Post      FEF 25-75% %Pred-Post      FEF 25-75% %Change      Expiratory Time      Expiratory Time-Post      PEF-Pre      PEF Pred      PEF %Pred-Pre      PEF-Post      PEF %Pred-Post      PEF %Change      MVV-Pre      MVV Pred      MVV %Pred-Pre      DLCO      DLCO Pred      DLCO %Pred      DLCO/VA      DLCO/VA Pred      DLCO/VA %Pred      VA      VA Pred      VA %Pred      Raw      Raw Pred      Raw %Pred      Gaw      GAW PRED      Gaw %Pred      SVC      SVC Pred      SVC %Pred      TLC      TLC Pred      TLC %Pred      RV      RV Pred      RV %Pred      IC      IC Pred      IC %Pred      VTG      VTG Pred      VTG %Pred      MIP      MEP         CT CHEST HIGH RESOLUTION    Result Date: 11/22/2022  EXAMINATION: CT IMAGES OF THE CHEST WITHOUT CONTRAST, HIGH RESOLUTION 11/17/2022 TECHNIQUE: CT of the chest was performed without the administration of intravenous contrast. High resolution CT imaging was performed of the lungs. Multiplanar reformatted images are provided for review. Automated exposure control, iterative reconstruction, and/or weight based adjustment of the mA/kV was utilized to reduce the radiation dose to as low as reasonably achievable. High resolution CT images were performed in the supine inspiration, supine expiration, and prone inspiration positions. COMPARISON: 02/06/2019 HISTORY: ORDERING SYSTEM PROVIDED HISTORY: ILD (interstitial lung disease) ([facility] [address] TECHNOLOGIST PROVIDED HISTORY: progressive HERNANDEZ with evidence for subpleural fibrosis on 2019 CTA; interval change Reason for Exam: ILD FINDINGS: Mediastinum:  No evidence of mediastinal, hilar or axillary lymphadenopathy. Aortic valve calcifications. Mitral valve calcifications are noted. No cardiomegaly or pericardial effusion. Central airways are clear. HRCT Findings/Lungs/pleura: There is extensive biapical pleural parenchymal scarring noted. There is extensive subpleural fibrosis with interlobular septal thickening and subpleural reticulation. There is honeycombing noted in the lung bases in the subpleural regions with mild bibasilar bronchiectasis. Findings may represent UIP or NSIP. There has been progression of the interstitial pulmonary fibrosis compared to prior examination.   No evidence of suspicious pulmonary nodules. No consolidation or pulmonary edema. No evidence of pleural effusion or pneumothorax. No evidence air trapping on inspiration or expiration imaging. Upper Abdomen:  Limited images of the upper abdomen demonstrate no acute abnormality. Soft Tissues/Bones: There is pectus excavatum deformity. Age related degenerative changes of the visualized osseous structures without focal destructive lesion. Pectus excavatum shifting the mediastinum to the left. Moderate interstitial pulmonary fibrotic changes progressed since prior examination. There is peripheral honeycombing although significant subpleural opacity is also noted. UIP and NSIP are in the differential.       Assessment:      No diagnosis found. Plan:      ***  Educated and clarified the medication use. Refills sent to Rx if requested. Recommend flu vaccination in the fall annually. ***  Patient is up-to-date with pneumococcal vaccinations from pulmonary perspective. Patient has *** received initial Covid vaccination *** Bivalent booster when eligible. Discussed strategies to protect against Covid 19. Maintain an active lifestyle. Patient's questions were answered to her satisfaction. Home O2 evaluation to be done - {YES/NO:92028}  Supplemental oxygen was {Blank single:84620::\"ordered at ***\",\"continued at ***\"}   {Blank single:85590::\"Patient is not a smoker\",\"Smoking cessation was recommended/continued\"}  Pulmonary function tests were reviewed per physician  Chest x-ray was reviewed  CT scan of the chest was reviewed  {Blanksingle:28264::\"Polysomnogram with CPAP/BiPAP titration if needed. Brochure provided on sleep apnea. Weight loss was recommended and discussed. Recommended following good sleep hygiene instructions. Sleep hygieneinstructions were given to the patient. We'll see the patient back after the sleep study. \",\"CPAP/BiPAP to be used for at least 4 hours every night. Use humidifier if needed.   Weight loss was recommended and discussed. Recommended following good sleep hygiene instructions. Sleep hygiene instructions given to the patient. Explained importance of compliance with treatment of sleep apnea. Compliance data was reviewed and the patient *** compliant per insurance requirement. \",\" Compliance data not available for my review.  Per patient report they use the machine faithfully every night, and report refreshing and restorative sleep without residual daytime fatigue\"}  We'll see the patient back in*** months          Electronically signed by JOHN Delgado CNP on 11/29/2022 at 1:52 PM

## 2022-12-08 DIAGNOSIS — F41.9 ANXIETY DISORDER, UNSPECIFIED TYPE: ICD-10-CM

## 2022-12-09 RX ORDER — ALPRAZOLAM 0.25 MG/1
TABLET ORAL
Qty: 45 TABLET | Refills: 0 | Status: SHIPPED | OUTPATIENT
Start: 2022-12-09 | End: 2023-01-08

## 2022-12-09 NOTE — TELEPHONE ENCOUNTER
Controlled Substance Monitoring:    Acute and Chronic Pain Monitoring:   RX Monitoring 12/9/2022   Attestation -   Periodic Controlled Substance Monitoring No signs of potential drug abuse or diversion identified. Obtained or confirmed \"Medication Contract\" on file.

## 2022-12-09 NOTE — TELEPHONE ENCOUNTER
Per [facility], last fill 11/3, quantity 45 for 30 days.         Cynthia Casanova called requesting a refill of the below medication which has been pended for you:     Requested Prescriptions     Pending Prescriptions Disp Refills    ALPRAZolam (XANAX) 0.25 MG tablet [Pharmacy Med Name: ALPRAZOLAM 0.25 MG TABLET] 45 tablet      Sig: take 1/2 tablet by mouth daily and 1 tablet by mouth nightly for anxiety       Last Appointment Date: 9/26/2022  Next Appointment Date: 3/27/2023    Allergies   Allergen Reactions    Asa [Aspirin]      Makes ears ring    Hctz [Hydrochlorothiazide] Hives    Imdur [Isosorbide Nitrate] Diarrhea and Other (See Comments)     Headaches and explosive diarrhea    Lisinopril      intolerant    Sulfa Antibiotics      procardia

## 2022-12-14 ENCOUNTER — OFFICE VISIT (OUTPATIENT)
Dept: PULMONOLOGY | Age: 87
End: 2022-12-14
Payer: MEDICARE

## 2022-12-14 VITALS
HEART RATE: 70 BPM | OXYGEN SATURATION: 97 % | DIASTOLIC BLOOD PRESSURE: 68 MMHG | SYSTOLIC BLOOD PRESSURE: 118 MMHG | BODY MASS INDEX: 23.3 KG/M2 | WEIGHT: 145 LBS | HEIGHT: 66 IN | TEMPERATURE: 97.2 F

## 2022-12-14 DIAGNOSIS — R06.00 DYSPNEA, UNSPECIFIED TYPE: ICD-10-CM

## 2022-12-14 DIAGNOSIS — J84.9 ILD (INTERSTITIAL LUNG DISEASE) (HCC): ICD-10-CM

## 2022-12-14 DIAGNOSIS — E88.01 HETEROZYGOUS ALPHA 1-ANTITRYPSIN DEFICIENCY (HCC): ICD-10-CM

## 2022-12-14 DIAGNOSIS — J84.112 IPF (IDIOPATHIC PULMONARY FIBROSIS) (HCC): Primary | ICD-10-CM

## 2022-12-14 PROCEDURE — G8427 DOCREV CUR MEDS BY ELIG CLIN: HCPCS | Performed by: NURSE PRACTITIONER

## 2022-12-14 PROCEDURE — 99214 OFFICE O/P EST MOD 30 MIN: CPT | Performed by: NURSE PRACTITIONER

## 2022-12-14 PROCEDURE — G8482 FLU IMMUNIZE ORDER/ADMIN: HCPCS | Performed by: NURSE PRACTITIONER

## 2022-12-14 PROCEDURE — 1123F ACP DISCUSS/DSCN MKR DOCD: CPT | Performed by: NURSE PRACTITIONER

## 2022-12-14 PROCEDURE — G8420 CALC BMI NORM PARAMETERS: HCPCS | Performed by: NURSE PRACTITIONER

## 2022-12-14 PROCEDURE — 1036F TOBACCO NON-USER: CPT | Performed by: NURSE PRACTITIONER

## 2022-12-14 PROCEDURE — 1090F PRES/ABSN URINE INCON ASSESS: CPT | Performed by: NURSE PRACTITIONER

## 2022-12-14 ASSESSMENT — ENCOUNTER SYMPTOMS
ALLERGIC/IMMUNOLOGIC NEGATIVE: 1
EYES NEGATIVE: 1
GASTROINTESTINAL NEGATIVE: 1

## 2022-12-14 NOTE — PROGRESS NOTES
Subjective:      Patient ID: Allison Quiroz is a 80 y.o. female. HPI  Patient here for follow-up for shortness of breath. Last seen in office per Dr. Jenna Case in November 2022    CT imaging with patient and family member. Discussed options such as Ofev and side effects of medications. At this time patient does not want to take any extra medications. 6-minute walk test was completed today, patient maintained an SPO2 of 96% or higher did not qualify for supplemental oxygen. Medications:   Albuterol HFA    PRIOR WORKUP:  PFT:  6-minute walk test 12/14/2022: Patient maintained an SPO2 of 66% or higher    PFT 11/9/22: Spirometry demonstrates a mild restrictive ventilatory defect. No significant bronchospastic component is identified. FVC 1.56 liters (66%), FEV1 1.46 liters (85%), FEV1/FVC 94%. Static lung volumes demonstrate moderate reductions in thoracic gas volume, RV, and TLC. RV 1.51 liters (54%), TLC 3.10 liters (57%), RV/TLC 49%. Diffusion capacity is markedly decreased. DLCO 12.48 (46%), but when corrected for alveolar volume, is normal.  Airways resistance is normal.    CT Imaging:  CT High Resolution 11/9/22: Extensive pleural-parenchymal scarring. Extensive pleural fibrosis with interlobular septal thickening and subpleural reticulation. Honeycombing in the lung bases in the subpleural regions with mild bibasilar bronchiectasis present UIP versus NSIP. Progression of interstitial fibrosis compared to prior exam.  No suspicious pulmonary nodules. No pleural effusion or pneumothorax. CT chest 2/6/2019 for right) excavated. No mediastinal adenopathy. Subpleural reticular is noted with an upper lobe predominance. Apical pleural thickening is noted without appreciable change. No consolidation or effusion.       Sleep Study:    Laboratory Evaluation:    Immunizations:   Immunization History   Administered Date(s) Administered    COVID-19, MODERNA BLUE border, Primary or Immunocompromised, (age 12y+), IM, 100 mcg/0.5mL 2021, 2021, 2021    Influenza Vaccine, unspecified formulation 11/15/2016    Influenza, AFLURIA (age 1 yrs+), FLUZONE, (age 10 mo+), MDV, 0.5mL 10/02/2017    Influenza, FLUARIX, FLULAVAL, FLUZONE (age 10 mo+) AND AFLURIA, (age 1 y+), PF, 0.5mL 2019, 2021, 2022    Influenza, FLUZONE (age 72 y+), High Dose, 0.7mL 10/13/2020    Influenza, High Dose (Fluzone 65 yrs and older) 10/05/2018, 2019, 10/13/2020    Pneumococcal Conjugate 13-valent (Dtzpcwb72) 2015    Pneumococcal Polysaccharide ([id number]) 2004, 11/15/2019    Zoster Live (Zostavax) 10/01/2012        No flowsheet data found.     /68 (Site: Right Upper Arm, Position: Sitting, Cuff Size: Medium Adult)   Pulse 70   Temp 97.2 °F (36.2 °C)   Ht 5' 6\" (1.676 m)   Wt 145 lb (65.8 kg)   SpO2 97%   BMI 23.40 kg/m²     Past Medical History:   Diagnosis Date    Anxiety     GERD (gastroesophageal reflux disease)     H/O cardiac catheterization 2010    Hearing loss     deaf in R ear from treatment for Meniere's disease    Hypertension      Past Surgical History:   Procedure Laterality Date    APPENDECTOMY      BREAST BIOPSY Right 2005    Dr Diomedes Roman      30% RCA stenosis    CHOLECYSTECTOMY      INNER EAR SURGERY Right     [facility]    LAPAROTOMY      MALIGNANT SKIN LESION EXCISION Left     SCC toe - ; L pinky finger -      Family History   Problem Relation Age of Onset    Diabetes Mother     Heart Attack Mother     Coronary Art Dis Father          of MI at 52    Ovarian Cancer Sister     Pancreatic Cancer Sister     Diabetes Sister     Other Sister         diphtheria    Other Sister         diphtheria    Coronary Art Dis Brother     Coronary Art Dis Brother     Coronary Art Dis Brother     Coronary Art Dis Brother     Coronary Art Dis Brother     Other Brother         accident       Social History     Socioeconomic History Marital status:      Spouse name: Not on file    Number of children: Not on file    Years of education: Not on file    Highest education level: Not on file   Occupational History    Not on file   Tobacco Use    Smoking status: Never    Smokeless tobacco: Never   Vaping Use    Vaping Use: Never used   Substance and Sexual Activity    Alcohol use: No    Drug use: No    Sexual activity: Not on file   Other Topics Concern    Not on file   Social History Narrative    Not on file     Social Determinants of Health     Financial Resource Strain: Not on file   Food Insecurity: Not on file   Transportation Needs: Not on file   Physical Activity: Unknown    Days of Exercise per Week: 0 days    Minutes of Exercise per Session: Not on file   Stress: Not on file   Social Connections: Not on file   Intimate Partner Violence: Not on file   Housing Stability: Not on file       Review of Systems   Constitutional:         Decreased activity tolerance secondary to exertional dyspnea. HENT:          Denies sinus pain/pressure. Denies rhinorrhea. Occasional cough   Eyes: Negative. Respiratory:          Exertional dyspnea. No significant cough. Denies sputum production-specifically purulent or hemoptysis   Cardiovascular: Negative. Gastrointestinal: Negative. Endocrine: Negative. Genitourinary: Negative. Musculoskeletal: Negative. Skin: Negative. Allergic/Immunologic: Negative. Neurological: Negative. Hematological: Negative. Psychiatric/Behavioral: Negative. Objective:       Physical Exam  General appearance - alert, well appearing, and in no distress, oriented to person, place, and time, and normal appearing weight-uses a cane and a travel wheelchair for mobility.   Mental status - alert, oriented to person, place, and time  Eyes - pupils equal and reactive, extraocular eye movements intact  Ears -not examined  Nose - normal and patent, no erythema, discharge or polyps  Mouth - mucous membranes moist, pharynx normal without lesions  Neck - supple, no significant adenopathy  Chest -lung sounds clear throughout. Fine bibasilar rales noted in bases left greater than right.   Heart -normal rate, regular rhythm, normal S1, S2, no murmurs, rubs, clicks or gallops  Abdomen - soft, nontender, nondistended, no masses or organomegaly  Neuro- alert, oriented, normal speech, no focal findings or movement disorder noted}  Extremities - peripheral pulses normal, no pedal edema, no clubbing or cyanosis  Skin - normal coloration and turgor, no rashes, no suspicious skin lesions noted     Wt Readings from Last 3 Encounters:   12/14/22 145 lb (65.8 kg)   11/09/22 145 lb (65.8 kg)   08/01/22 145 lb (65.8 kg)       Results for orders placed or performed during the hospital encounter of 11/09/22   Full PFT Study With Bronchodilator   Result Value Ref Range    FVC-Pre      FVC Pred      FVC %Pred-Pre      PVC-Post      FVC %Pred-Post      FEV1-Pre      FEV1 Pred      FEV1 %Pred-Pre      FEV1-Post      FEV1 %Pred-Post      FEV1/FVC-Pre      FEV1/FVC Pred      FEV1/FVC %Pred-Pre      FEV1/FVC-Post      FEV1/FVC %Pred-Post      FEF 25-75%-Pre      FEF 25-75% Pred      FEF 25-75% %Pred-Pre      FEF 25-75%-Post      FEF 25-75% %Pred-Post      FEF 25-75% %Change      Expiratory Time      Expiratory Time-Post      PEF-Pre      PEF Pred      PEF %Pred-Pre      PEF-Post      PEF %Pred-Post      PEF %Change      MVV-Pre      MVV Pred      MVV %Pred-Pre      DLCO      DLCO Pred      DLCO %Pred      DLCO/VA      DLCO/VA Pred      DLCO/VA %Pred      VA      VA Pred      VA %Pred      Raw      Raw Pred      Raw %Pred      Gaw      GAW PRED      Gaw %Pred      SVC      SVC Pred      SVC %Pred      TLC      TLC Pred      TLC %Pred      RV      RV Pred      RV %Pred      IC      IC Pred      IC %Pred      VTG      VTG Pred      VTG %Pred      MIP      MEP         CT CHEST HIGH RESOLUTION    Result Date: 11/22/2022  EXAMINATION: CT IMAGES OF THE CHEST WITHOUT CONTRAST, HIGH RESOLUTION 11/17/2022 TECHNIQUE: CT of the chest was performed without the administration of intravenous contrast. High resolution CT imaging was performed of the lungs. Multiplanar reformatted images are provided for review. Automated exposure control, iterative reconstruction, and/or weight based adjustment of the mA/kV was utilized to reduce the radiation dose to as low as reasonably achievable. High resolution CT images were performed in the supine inspiration, supine expiration, and prone inspiration positions. COMPARISON: 02/06/2019 HISTORY: ORDERING SYSTEM PROVIDED HISTORY: ILD (interstitial lung disease) ([facility] [address] TECHNOLOGIST PROVIDED HISTORY: progressive HERNANDEZ with evidence for subpleural fibrosis on 2019 CTA; interval change Reason for Exam: ILD FINDINGS: Mediastinum:  No evidence of mediastinal, hilar or axillary lymphadenopathy. Aortic valve calcifications. Mitral valve calcifications are noted. No cardiomegaly or pericardial effusion. Central airways are clear. HRCT Findings/Lungs/pleura: There is extensive biapical pleural parenchymal scarring noted. There is extensive subpleural fibrosis with interlobular septal thickening and subpleural reticulation. There is honeycombing noted in the lung bases in the subpleural regions with mild bibasilar bronchiectasis. Findings may represent UIP or NSIP. There has been progression of the interstitial pulmonary fibrosis compared to prior examination. No evidence of suspicious pulmonary nodules. No consolidation or pulmonary edema. No evidence of pleural effusion or pneumothorax. No evidence air trapping on inspiration or expiration imaging. Upper Abdomen:  Limited images of the upper abdomen demonstrate no acute abnormality. Soft Tissues/Bones: There is pectus excavatum deformity. Age related degenerative changes of the visualized osseous structures without focal destructive lesion.      Pectus excavatum shifting the mediastinum to the left. Moderate interstitial pulmonary fibrotic changes progressed since prior examination. There is peripheral honeycombing although significant subpleural opacity is also noted. UIP and NSIP are in the differential.       Assessment:      1. IPF (idiopathic pulmonary fibrosis) ([facility] [address]    2. Heterozygous alpha 1-antitrypsin deficiency ([facility] [address]    3. ILD (interstitial lung disease) ([facility] [address]    4. Dyspnea, unspecified type          Plan:      Medications reviewed, continue as ordered. And at this time would like to defer any treatment with IPF meds such as Ofev  Educated and clarified the medication use. Refills sent to Rx if requested. Recommend flu vaccination in the fall annually. Patient is up-to-date with pneumococcal vaccinations from pulmonary perspective. Patient has received initial Covid vaccination recommended bivalent booster when eligible. Discussed strategies to protect against Covid 19. Maintain an active lifestyle. Patient's questions were answered to her satisfaction. Ho 6-minute walk test completed today. Patient did not qualify for supplemental oxygen.     Pulmonary function tests were reviewed per physician  CT scan of the chest was reviewed  We'll see the patient back in 6 months          Electronically signed by JOHN Turner CNP on 12/14/2022 at 2:59 PM

## 2022-12-28 ENCOUNTER — OFFICE VISIT (OUTPATIENT)
Dept: PRIMARY CARE CLINIC | Age: 87
End: 2022-12-28
Payer: MEDICARE

## 2022-12-28 VITALS
DIASTOLIC BLOOD PRESSURE: 86 MMHG | WEIGHT: 145 LBS | BODY MASS INDEX: 23.3 KG/M2 | RESPIRATION RATE: 26 BRPM | SYSTOLIC BLOOD PRESSURE: 134 MMHG | HEART RATE: 98 BPM | TEMPERATURE: 99.6 F | HEIGHT: 66 IN | OXYGEN SATURATION: 98 %

## 2022-12-28 DIAGNOSIS — J06.9 ACUTE UPPER RESPIRATORY INFECTION: Primary | ICD-10-CM

## 2022-12-28 PROCEDURE — 1036F TOBACCO NON-USER: CPT | Performed by: FAMILY MEDICINE

## 2022-12-28 PROCEDURE — G8420 CALC BMI NORM PARAMETERS: HCPCS | Performed by: FAMILY MEDICINE

## 2022-12-28 PROCEDURE — 1123F ACP DISCUSS/DSCN MKR DOCD: CPT | Performed by: FAMILY MEDICINE

## 2022-12-28 PROCEDURE — 1090F PRES/ABSN URINE INCON ASSESS: CPT | Performed by: FAMILY MEDICINE

## 2022-12-28 PROCEDURE — 99213 OFFICE O/P EST LOW 20 MIN: CPT | Performed by: FAMILY MEDICINE

## 2022-12-28 PROCEDURE — 99212 OFFICE O/P EST SF 10 MIN: CPT | Performed by: FAMILY MEDICINE

## 2022-12-28 PROCEDURE — G8427 DOCREV CUR MEDS BY ELIG CLIN: HCPCS | Performed by: FAMILY MEDICINE

## 2022-12-28 PROCEDURE — G8482 FLU IMMUNIZE ORDER/ADMIN: HCPCS | Performed by: FAMILY MEDICINE

## 2022-12-28 RX ORDER — AZITHROMYCIN 250 MG/1
250 TABLET, FILM COATED ORAL SEE ADMIN INSTRUCTIONS
Qty: 6 TABLET | Refills: 0 | Status: SHIPPED | OUTPATIENT
Start: 2022-12-28 | End: 2023-01-02

## 2022-12-28 RX ORDER — PREDNISONE 20 MG/1
40 TABLET ORAL DAILY
Qty: 10 TABLET | Refills: 0 | Status: SHIPPED | OUTPATIENT
Start: 2022-12-28 | End: 2023-01-02

## 2022-12-28 ASSESSMENT — ENCOUNTER SYMPTOMS
COUGH: 1
RHINORRHEA: 1
GASTROINTESTINAL NEGATIVE: 1
EYES NEGATIVE: 1
SORE THROAT: 1
ALLERGIC/IMMUNOLOGIC NEGATIVE: 1
SHORTNESS OF BREATH: 1

## 2022-12-28 NOTE — PROGRESS NOTES
Subjective:      Patient ID: Nai Mccarty is a 80 y.o. female. HPI  acute urgent care visit for about a week of cough and cold symptoms. Increasing cough, sob, hearing  seems off/\"in a well\". Geeling a headache and generalized weakness. Staying at home for the most part, not sure where she picked up an illness. Suspicion for pulmonary fibrosis , not problematic per son , following with pulmonology. Past Medical History:   Diagnosis Date    Anxiety     GERD (gastroesophageal reflux disease)     H/O cardiac catheterization 07/2010    Hearing loss     deaf in R ear from treatment for Meniere's disease    Hypertension      Past Surgical History:   Procedure Laterality Date    APPENDECTOMY      BREAST BIOPSY Right 07/2005    Dr Tin Greene  2010    30% RCA stenosis    CHOLECYSTECTOMY      INNER EAR SURGERY Right 1989    [place]    LAPAROTOMY      MALIGNANT SKIN LESION EXCISION Left 2014    SCC toe - 2014; L pinky finger - 2022     Current Outpatient Medications   Medication Sig Dispense Refill    ALPRAZolam (XANAX) 0.25 MG tablet take 1/2 tablet by mouth daily and 1 tablet by mouth nightly for anxiety 45 tablet 0    albuterol sulfate HFA (PROVENTIL;VENTOLIN;PROAIR) 108 (90 Base) MCG/ACT inhaler Inhale 2 puffs into the lungs every 6 hours as needed for Wheezing      carvedilol (COREG) 3.125 MG tablet Take 1 tablet by mouth 2 times daily 180 tablet 3    Psyllium (METAMUCIL PO) Take 3 each by mouth daily      meclizine (ANTIVERT) 25 MG tablet Take 0.5-1 tablets by mouth 3 times daily as needed for Dizziness 10 tablet 0    Ascorbic Acid (VITAMIN C) 250 MG tablet Take 250 mg by mouth daily      nitroGLYCERIN (NITROSTAT) 0.4 MG SL tablet Place 1 tablet under the tongue every 5 minutes as needed for Chest pain up to max of 3 total doses.  If no relief after 1 dose, call 911. 25 tablet 1    aspirin 81 MG chewable tablet Take 81 mg by mouth daily      furosemide (LASIX) 20 MG tablet Take 1 tablet by mouth daily as needed (PRN swelling, weight gain) (Patient not taking: No sig reported) 90 tablet 1    potassium chloride (KLOR-CON M) 10 MEQ extended release tablet Once daily as needed (take with furosemide) (Patient not taking: Reported on 12/28/2022) 60 tablet 0     No current facility-administered medications for this visit. Review of Systems   Constitutional:  Positive for fatigue and fever. HENT:  Positive for congestion, rhinorrhea and sore throat. Eyes: Negative. Respiratory:  Positive for cough and shortness of breath. Cardiovascular: Negative. Gastrointestinal: Negative. Endocrine: Negative. Genitourinary: Negative. Musculoskeletal: Negative. Skin: Negative. Allergic/Immunologic: Negative. Neurological:  Positive for weakness and headaches. Hematological: Negative. Psychiatric/Behavioral: Negative. Objective:   Physical Exam  Constitutional:       General: She is not in acute distress. Appearance: She is well-developed. HENT:      Head: Normocephalic and atraumatic. Right Ear: External ear normal.      Left Ear: External ear normal.      Mouth/Throat:      Pharynx: No oropharyngeal exudate. Eyes:      General: No scleral icterus. Conjunctiva/sclera: Conjunctivae normal.   Neck:      Thyroid: No thyromegaly. Cardiovascular:      Rate and Rhythm: Normal rate and regular rhythm. Heart sounds: Normal heart sounds. No murmur heard. Pulmonary:      Effort: Tachypnea (mild at rest.  no conversational dyspnea) present. No accessory muscle usage, prolonged expiration, respiratory distress or retractions. Breath sounds: Examination of the right-upper field reveals decreased breath sounds. Decreased breath sounds and rales (some opening noise over posterior right middle lobe) present. No wheezing or rhonchi. Abdominal:      General: Bowel sounds are normal. There is no distension. Palpations: Abdomen is soft. Tenderness: There is no abdominal tenderness. There is no rebound. Musculoskeletal:         General: No tenderness. Normal range of motion. Cervical back: Neck supple. Skin:     General: Skin is warm and dry. Findings: No erythema or rash. Neurological:      Mental Status: She is alert and oriented to person, place, and time. Psychiatric:         Behavior: Behavior normal.         Thought Content: Thought content normal.         Judgment: Judgment normal.     /86 (Site: Left Upper Arm, Position: Sitting, Cuff Size: Medium Adult)   Pulse 98   Temp 99.6 °F (37.6 °C) (Tympanic)   Resp 26   Ht 5' 6\" (1.676 m)   Wt 145 lb (65.8 kg)   SpO2 98%   BMI 23.40 kg/m²     Assessment:      Encounter Diagnosis   Name Primary? Acute upper respiratory infection Yes     Viral source suspected. Persistent cough at present. No hypoxia or resp. Distress at present. Plan: At risk for complications. Plan prednisone 40mg/day x 5 days  Plan zpak x 5 days. Increase humidity, cont. Cough suppression. Follow up prn concerns.         Darrell Pacheco MD

## 2023-01-23 DIAGNOSIS — F41.9 ANXIETY DISORDER, UNSPECIFIED TYPE: ICD-10-CM

## 2023-01-23 NOTE — TELEPHONE ENCOUNTER
Per [facility] last refill 12/09 #45    Charlene Chata called requesting a refill of the below medication which has been pended for you:     Requested Prescriptions     Pending Prescriptions Disp Refills    ALPRAZolam (XANAX) 0.25 MG tablet [Pharmacy Med Name: ALPRAZOLAM 0.25 MG TABLET] 45 tablet      Sig: take 1/2 tablet by mouth daily and 1 tablet by mouth nightly for anxiety       Last Appointment Date: 9/26/2022  Next Appointment Date: 3/27/2023    Allergies   Allergen Reactions    Asa [Aspirin]      Makes ears ring    Hctz [Hydrochlorothiazide] Hives    Imdur [Isosorbide Nitrate] Diarrhea and Other (See Comments)     Headaches and explosive diarrhea    Lisinopril      intolerant    Sulfa Antibiotics      procardia

## 2023-01-24 RX ORDER — ALPRAZOLAM 0.25 MG/1
TABLET ORAL
Qty: 45 TABLET | Refills: 0 | Status: SHIPPED | OUTPATIENT
Start: 2023-01-24 | End: 2023-02-22

## 2023-01-25 NOTE — TELEPHONE ENCOUNTER
Controlled Substance Monitoring:    Acute and Chronic Pain Monitoring:   RX Monitoring 1/24/2023   Attestation -   Periodic Controlled Substance Monitoring No signs of potential drug abuse or diversion identified. Obtained or confirmed \"Medication Contract\" on file.

## 2023-02-21 RX ORDER — ALBUTEROL SULFATE 90 UG/1
AEROSOL, METERED RESPIRATORY (INHALATION)
Qty: 25.5 G | OUTPATIENT
Start: 2023-02-21

## 2023-03-21 DIAGNOSIS — F41.9 ANXIETY DISORDER, UNSPECIFIED TYPE: ICD-10-CM

## 2023-03-21 NOTE — TELEPHONE ENCOUNTER
Dr. Faby Kong out of office until 3/17. Per [facility], last fill 1/25/23, quantity 45 for 30 days.      Chris Suazo called requesting a refill of the below medication which has been pended for you:     Requested Prescriptions     Pending Prescriptions Disp Refills    ALPRAZolam (XANAX) 0.25 MG tablet 45 tablet 0     Sig: Take 1/2 tab by mouth daily and 1 tab by mouth nightly for anxiety       Last Appointment Date: 9/26/2022  Next Appointment Date: 3/27/2023    Allergies   Allergen Reactions    Asa [Aspirin]      Makes ears ring    Hctz [Hydrochlorothiazide] Hives    Imdur [Isosorbide Nitrate] Diarrhea and Other (See Comments)     Headaches and explosive diarrhea    Lisinopril      intolerant    Sulfa Antibiotics      procardia

## 2023-03-23 RX ORDER — ALPRAZOLAM 0.25 MG/1
TABLET ORAL
Qty: 45 TABLET | Refills: 0 | Status: SHIPPED | OUTPATIENT
Start: 2023-03-23 | End: 2023-04-19

## 2023-03-27 ENCOUNTER — OFFICE VISIT (OUTPATIENT)
Dept: FAMILY MEDICINE CLINIC | Age: 88
End: 2023-03-27
Payer: MEDICARE

## 2023-03-27 VITALS
HEART RATE: 75 BPM | BODY MASS INDEX: 22.82 KG/M2 | HEIGHT: 66 IN | DIASTOLIC BLOOD PRESSURE: 70 MMHG | WEIGHT: 142 LBS | OXYGEN SATURATION: 97 % | TEMPERATURE: 97.3 F | SYSTOLIC BLOOD PRESSURE: 124 MMHG

## 2023-03-27 DIAGNOSIS — I10 ESSENTIAL HYPERTENSION: Primary | ICD-10-CM

## 2023-03-27 DIAGNOSIS — L98.9 SKIN LESION OF CHEEK: ICD-10-CM

## 2023-03-27 DIAGNOSIS — J84.10 PULMONARY FIBROSIS (HCC): ICD-10-CM

## 2023-03-27 DIAGNOSIS — R60.0 BILATERAL LOWER EXTREMITY EDEMA: ICD-10-CM

## 2023-03-27 DIAGNOSIS — H61.22 IMPACTED CERUMEN, LEFT EAR: ICD-10-CM

## 2023-03-27 DIAGNOSIS — F41.9 ANXIETY DISORDER, UNSPECIFIED TYPE: ICD-10-CM

## 2023-03-27 PROCEDURE — 1123F ACP DISCUSS/DSCN MKR DOCD: CPT | Performed by: FAMILY MEDICINE

## 2023-03-27 PROCEDURE — G8420 CALC BMI NORM PARAMETERS: HCPCS | Performed by: FAMILY MEDICINE

## 2023-03-27 PROCEDURE — G8482 FLU IMMUNIZE ORDER/ADMIN: HCPCS | Performed by: FAMILY MEDICINE

## 2023-03-27 PROCEDURE — 1036F TOBACCO NON-USER: CPT | Performed by: FAMILY MEDICINE

## 2023-03-27 PROCEDURE — G8427 DOCREV CUR MEDS BY ELIG CLIN: HCPCS | Performed by: FAMILY MEDICINE

## 2023-03-27 PROCEDURE — 99214 OFFICE O/P EST MOD 30 MIN: CPT | Performed by: FAMILY MEDICINE

## 2023-03-27 PROCEDURE — 99213 OFFICE O/P EST LOW 20 MIN: CPT | Performed by: FAMILY MEDICINE

## 2023-03-27 PROCEDURE — 69210 REMOVE IMPACTED EAR WAX UNI: CPT | Performed by: FAMILY MEDICINE

## 2023-03-27 PROCEDURE — 1090F PRES/ABSN URINE INCON ASSESS: CPT | Performed by: FAMILY MEDICINE

## 2023-03-27 RX ORDER — ALBUTEROL SULFATE 90 UG/1
1-2 AEROSOL, METERED RESPIRATORY (INHALATION) EVERY 6 HOURS PRN
Qty: 18 G | Refills: 3 | Status: SHIPPED | OUTPATIENT
Start: 2023-03-27

## 2023-03-27 SDOH — ECONOMIC STABILITY: FOOD INSECURITY: WITHIN THE PAST 12 MONTHS, YOU WORRIED THAT YOUR FOOD WOULD RUN OUT BEFORE YOU GOT MONEY TO BUY MORE.: NEVER TRUE

## 2023-03-27 SDOH — ECONOMIC STABILITY: INCOME INSECURITY: HOW HARD IS IT FOR YOU TO PAY FOR THE VERY BASICS LIKE FOOD, HOUSING, MEDICAL CARE, AND HEATING?: NOT HARD AT ALL

## 2023-03-27 SDOH — ECONOMIC STABILITY: FOOD INSECURITY: WITHIN THE PAST 12 MONTHS, THE FOOD YOU BOUGHT JUST DIDN'T LAST AND YOU DIDN'T HAVE MONEY TO GET MORE.: NEVER TRUE

## 2023-03-27 SDOH — ECONOMIC STABILITY: HOUSING INSECURITY
IN THE LAST 12 MONTHS, WAS THERE A TIME WHEN YOU DID NOT HAVE A STEADY PLACE TO SLEEP OR SLEPT IN A SHELTER (INCLUDING NOW)?: NO

## 2023-03-27 ASSESSMENT — PATIENT HEALTH QUESTIONNAIRE - PHQ9
SUM OF ALL RESPONSES TO PHQ QUESTIONS 1-9: 0
SUM OF ALL RESPONSES TO PHQ QUESTIONS 1-9: 0
1. LITTLE INTEREST OR PLEASURE IN DOING THINGS: 0
SUM OF ALL RESPONSES TO PHQ QUESTIONS 1-9: 0
SUM OF ALL RESPONSES TO PHQ QUESTIONS 1-9: 0
2. FEELING DOWN, DEPRESSED OR HOPELESS: 0
SUM OF ALL RESPONSES TO PHQ9 QUESTIONS 1 & 2: 0

## 2023-03-27 NOTE — PROGRESS NOTES
[facility]  [address], [address] [place]  [phone]      Erwin Hong is a 80 y.o. female who presents today for her medical conditions/complaints as noted below. Erwin Hong is c/o of Hypertension and Anxiety      HPI:     Pt here today for follow-up of HTN and anxiety. BP well-controlled today - 124/70. Taking Coreg 3.125 mg BID for HTN - stable. Does not check BP at home . Taking Xanax 0.125 mg (1/2 of a 0.25 mg tab) during the day and 0.25 mg nightly for anxiety - stable; has been on this dose for quite awhile and tolerates well. States it is helping her with her nerves. Last filled on 3/23/23. Taking ASA 81 mg daily. Has not to take Lasix recently - uses as needed per Cardio for \"notable weight gain\" or swelling. Has not had to use recently at all. Wearing b/l knee-high compression stockings daily    Pt has been having trouble with L eye dryness; also has noticed \"jagged line\" in her vision intermittently (not often) that has been occurring. Will not last long. Will sometimes have a \"black spot\" in her vision on the side where she cannot see when that happens. Has not seen eye doctor recently - used to see Dr. Freddy Myles. Complains of dry, crusted areas on her skin all over - hands, legs, face. Using lotion regularly, but it does not help. Used to see Dr. Violette Barrios for Dermatology. Using Albuterol inhaler every morning to start the day - works well.           Past Medical History:   Diagnosis Date    Anxiety     GERD (gastroesophageal reflux disease)     H/O cardiac catheterization 07/2010    Hearing loss     deaf in R ear from treatment for Meniere's disease    Hypertension       Past Surgical History:   Procedure Laterality Date    APPENDECTOMY      BREAST BIOPSY Right 07/2005    Dr William Torres  2010    30% RCA stenosis    CHOLECYSTECTOMY      INNER EAR SURGERY Right 1989    [facility]

## 2023-05-02 DIAGNOSIS — F41.9 ANXIETY DISORDER, UNSPECIFIED TYPE: ICD-10-CM

## 2023-05-02 RX ORDER — ALPRAZOLAM 0.25 MG/1
TABLET ORAL
Qty: 45 TABLET | OUTPATIENT
Start: 2023-05-02

## 2023-05-02 NOTE — TELEPHONE ENCOUNTER
Jose Serrato called requesting a refill of the below medication which has been pended for you: last filled 3/23/2023 #45    Requested Prescriptions     Pending Prescriptions Disp Refills    ALPRAZolam (XANAX) 0.25 MG tablet [Pharmacy Med Name: ALPRAZOLAM 0.25 MG TABLET] 45 tablet      Sig: take 1/2 by mouth once daily and then 1 tablet nightly for anxiety       Last Appointment Date: 3/27/2023  Next Appointment Date: 9/27/2023    Allergies   Allergen Reactions    Asa [Aspirin]      Makes ears ring    Hctz [Hydrochlorothiazide] Hives    Imdur [Isosorbide Nitrate] Diarrhea and Other (See Comments)     Headaches and explosive diarrhea    Lisinopril      intolerant    Sulfa Antibiotics      procardia

## 2023-05-02 NOTE — TELEPHONE ENCOUNTER
Edvin Gomez called requesting a refill of the below medication which has been pended for you: declined as duplicate    Requested Prescriptions     Pending Prescriptions Disp Refills    ALPRAZolam (XANAX) 0.25 MG tablet [Pharmacy Med Name: ALPRAZOLAM 0.25 MG TABLET] 45 tablet      Sig: take 1/2 by mouth once daily and then 1 tablet nightly for anxiety       Last Appointment Date: 3/27/2023  Next Appointment Date: 9/27/2023    Allergies   Allergen Reactions    Asa [Aspirin]      Makes ears ring    Hctz [Hydrochlorothiazide] Hives    Imdur [Isosorbide Nitrate] Diarrhea and Other (See Comments)     Headaches and explosive diarrhea    Lisinopril      intolerant    Sulfa Antibiotics      procardia

## 2023-05-04 DIAGNOSIS — F41.9 ANXIETY DISORDER, UNSPECIFIED TYPE: ICD-10-CM

## 2023-05-04 RX ORDER — ALPRAZOLAM 0.25 MG/1
TABLET ORAL
Qty: 45 TABLET | Refills: 0 | Status: SHIPPED | OUTPATIENT
Start: 2023-05-04 | End: 2023-06-01

## 2023-05-04 NOTE — TELEPHONE ENCOUNTER
Controlled Substance Monitoring:    Acute and Chronic Pain Monitoring:   RX Monitoring 5/4/2023   Attestation -   Periodic Controlled Substance Monitoring No signs of potential drug abuse or diversion identified. Obtained or confirmed \"Medication Contract\" on file.

## 2023-05-05 RX ORDER — ALPRAZOLAM 0.25 MG/1
TABLET ORAL
Qty: 45 TABLET | OUTPATIENT
Start: 2023-05-05

## 2023-07-27 DIAGNOSIS — F41.9 ANXIETY DISORDER, UNSPECIFIED TYPE: ICD-10-CM

## 2023-07-27 RX ORDER — ALPRAZOLAM 0.25 MG/1
0.25 TABLET ORAL NIGHTLY PRN
Status: CANCELLED | OUTPATIENT
Start: 2023-07-27

## 2023-07-27 RX ORDER — ALPRAZOLAM 0.25 MG/1
TABLET ORAL
Qty: 45 TABLET | Refills: 0 | Status: SHIPPED | OUTPATIENT
Start: 2023-07-27 | End: 2023-08-24

## 2023-07-27 NOTE — TELEPHONE ENCOUNTER
Per [facility], last fill 05/04/23, quantity 45 for 30 days.        Pratik Majano called requesting a refill of the below medication which has been pended for you:     Requested Prescriptions     Pending Prescriptions Disp Refills    ALPRAZolam (XANAX) 0.25 MG tablet 45 tablet 0     Sig: Take 1/2 tab by mouth once daily and then 1 tablet nightly for anxiety       Last Appointment Date: 3/27/2023  Next Appointment Date: 9/27/2023    Allergies   Allergen Reactions    Asa [Aspirin]      Makes ears ring    Hctz [Hydrochlorothiazide] Hives    Imdur [Isosorbide Nitrate] Diarrhea and Other (See Comments)     Headaches and explosive diarrhea    Lisinopril      intolerant    Sulfa Antibiotics      procardia
Pt requesting refill to loaded pharmacy. She states she needs this today.
IBW

## 2023-09-27 ENCOUNTER — HOSPITAL ENCOUNTER (OUTPATIENT)
Age: 88
Setting detail: SPECIMEN
Discharge: HOME | End: 2023-09-27
Payer: MEDICARE

## 2023-09-27 ENCOUNTER — OFFICE VISIT (OUTPATIENT)
Dept: FAMILY MEDICINE CLINIC | Age: 88
End: 2023-09-27
Payer: MEDICARE

## 2023-09-27 ENCOUNTER — IMMUNIZATION (OUTPATIENT)
Dept: LAB | Age: 88
End: 2023-09-27
Payer: MEDICARE

## 2023-09-27 VITALS
BODY MASS INDEX: 22.5 KG/M2 | WEIGHT: 140 LBS | SYSTOLIC BLOOD PRESSURE: 136 MMHG | OXYGEN SATURATION: 97 % | RESPIRATION RATE: 26 BRPM | DIASTOLIC BLOOD PRESSURE: 72 MMHG | TEMPERATURE: 97.3 F | HEART RATE: 83 BPM | HEIGHT: 66 IN

## 2023-09-27 DIAGNOSIS — R06.02 SOB (SHORTNESS OF BREATH): ICD-10-CM

## 2023-09-27 DIAGNOSIS — L30.9 DERMATITIS: ICD-10-CM

## 2023-09-27 DIAGNOSIS — Z23 NEED FOR INFLUENZA VACCINATION: ICD-10-CM

## 2023-09-27 DIAGNOSIS — R06.02 SOB (SHORTNESS OF BREATH): Primary | ICD-10-CM

## 2023-09-27 DIAGNOSIS — I10 ESSENTIAL HYPERTENSION: ICD-10-CM

## 2023-09-27 DIAGNOSIS — F41.9 ANXIETY DISORDER, UNSPECIFIED TYPE: ICD-10-CM

## 2023-09-27 DIAGNOSIS — J84.10 PULMONARY FIBROSIS (HCC): ICD-10-CM

## 2023-09-27 LAB
ALBUMIN SERPL-MCNC: 4 G/DL (ref 3.5–5.2)
ALBUMIN/GLOB SERPL: 1.1 {RATIO} (ref 1–2.5)
ALP SERPL-CCNC: 76 U/L (ref 35–104)
ALT SERPL-CCNC: 11 U/L (ref 5–33)
ANION GAP SERPL CALCULATED.3IONS-SCNC: 11 MMOL/L (ref 9–17)
AST SERPL-CCNC: 22 U/L
BASOPHILS # BLD: 0.13 K/UL (ref 0–0.2)
BASOPHILS NFR BLD: 2 % (ref 0–2)
BILIRUB SERPL-MCNC: 0.5 MG/DL (ref 0.3–1.2)
BUN SERPL-MCNC: 20 MG/DL (ref 8–23)
BUN/CREAT SERPL: 25 (ref 9–20)
CALCIUM SERPL-MCNC: 9.6 MG/DL (ref 8.6–10.4)
CHLORIDE SERPL-SCNC: 102 MMOL/L (ref 98–107)
CO2 SERPL-SCNC: 25 MMOL/L (ref 20–31)
CREAT SERPL-MCNC: 0.8 MG/DL (ref 0.5–0.9)
EOSINOPHIL # BLD: 0.36 K/UL (ref 0–0.44)
EOSINOPHILS RELATIVE PERCENT: 5 % (ref 1–4)
ERYTHROCYTE [DISTWIDTH] IN BLOOD BY AUTOMATED COUNT: 13.3 % (ref 11.8–14.4)
GFR SERPL CREATININE-BSD FRML MDRD: >60 ML/MIN/1.73M2
GLUCOSE SERPL-MCNC: 106 MG/DL (ref 70–99)
HCT VFR BLD AUTO: 37.5 % (ref 36.3–47.1)
HGB BLD-MCNC: 12.6 G/DL (ref 11.9–15.1)
IMM GRANULOCYTES # BLD AUTO: <0.03 K/UL (ref 0–0.3)
IMM GRANULOCYTES NFR BLD: 0 %
LYMPHOCYTES NFR BLD: 2.91 K/UL (ref 1.1–3.7)
LYMPHOCYTES RELATIVE PERCENT: 37 % (ref 24–43)
MCH RBC QN AUTO: 35.7 PG (ref 25.2–33.5)
MCHC RBC AUTO-ENTMCNC: 33.6 G/DL (ref 25.2–33.5)
MCV RBC AUTO: 106.2 FL (ref 82.6–102.9)
MONOCYTES NFR BLD: 0.7 K/UL (ref 0.1–1.2)
MONOCYTES NFR BLD: 9 % (ref 3–12)
NEUTROPHILS NFR BLD: 47 % (ref 36–65)
NEUTS SEG NFR BLD: 3.67 K/UL (ref 1.5–8.1)
NRBC BLD-RTO: 0 PER 100 WBC
PLATELET # BLD AUTO: 257 K/UL (ref 138–453)
PMV BLD AUTO: 11.3 FL (ref 8.1–13.5)
POTASSIUM SERPL-SCNC: 4.4 MMOL/L (ref 3.7–5.3)
PROT SERPL-MCNC: 7.8 G/DL (ref 6.4–8.3)
RBC # BLD AUTO: 3.53 M/UL (ref 3.95–5.11)
RBC # BLD: ABNORMAL 10*6/UL
SODIUM SERPL-SCNC: 138 MMOL/L (ref 135–144)
WBC OTHER # BLD: 7.8 K/UL (ref 3.5–11.3)

## 2023-09-27 PROCEDURE — G8427 DOCREV CUR MEDS BY ELIG CLIN: HCPCS | Performed by: FAMILY MEDICINE

## 2023-09-27 PROCEDURE — 99213 OFFICE O/P EST LOW 20 MIN: CPT

## 2023-09-27 PROCEDURE — 99214 OFFICE O/P EST MOD 30 MIN: CPT | Performed by: FAMILY MEDICINE

## 2023-09-27 PROCEDURE — G8420 CALC BMI NORM PARAMETERS: HCPCS | Performed by: FAMILY MEDICINE

## 2023-09-27 PROCEDURE — 85025 COMPLETE CBC W/AUTO DIFF WBC: CPT

## 2023-09-27 PROCEDURE — 80053 COMPREHEN METABOLIC PANEL: CPT

## 2023-09-27 PROCEDURE — 1123F ACP DISCUSS/DSCN MKR DOCD: CPT | Performed by: FAMILY MEDICINE

## 2023-09-27 PROCEDURE — 1090F PRES/ABSN URINE INCON ASSESS: CPT | Performed by: FAMILY MEDICINE

## 2023-09-27 PROCEDURE — 36415 COLL VENOUS BLD VENIPUNCTURE: CPT

## 2023-09-27 PROCEDURE — 1036F TOBACCO NON-USER: CPT | Performed by: FAMILY MEDICINE

## 2023-09-27 PROCEDURE — 90686 IIV4 VACC NO PRSV 0.5 ML IM: CPT | Performed by: FAMILY MEDICINE

## 2023-09-27 RX ORDER — ALPRAZOLAM 0.25 MG/1
0.25 TABLET ORAL NIGHTLY PRN
Status: CANCELLED | OUTPATIENT
Start: 2023-09-27

## 2023-09-27 RX ORDER — TRIAMCINOLONE ACETONIDE 1 MG/G
OINTMENT TOPICAL
Qty: 30 G | Refills: 3 | Status: SHIPPED | OUTPATIENT
Start: 2023-09-27

## 2023-09-27 RX ORDER — ALPRAZOLAM 0.25 MG/1
TABLET ORAL
Qty: 45 TABLET | Refills: 0 | Status: SHIPPED | OUTPATIENT
Start: 2023-09-27 | End: 2023-10-25

## 2023-09-27 ASSESSMENT — ENCOUNTER SYMPTOMS: CONSTIPATION: 0

## 2023-09-27 NOTE — PROGRESS NOTES
anxiety, Disp-45 tablet, R-0Normal  4. Essential hypertension  5. Dermatitis  -     triamcinolone (KENALOG) 0.1 % ointment; Apply topically to affected areas twice daily as needed. , Disp-30 g, R-3, Normal  6. Need for influenza vaccination         Plan:      Canister of oxygen and if not helping, may try nebulizer treatments to see if this helps with SOB. Recommended to see Pulm earlier than December. Return in about 6 months (around 3/27/2024) for f/u SOB, HTN, anxiety. Orders Placed This Encounter   Procedures    CBC with Auto Differential     Standing Status:   Future     Number of Occurrences:   1     Standing Expiration Date:   9/26/2024    Comprehensive Metabolic Panel     Standing Status:   Future     Number of Occurrences:   1     Standing Expiration Date:   9/27/2024     Orders Placed This Encounter   Medications    triamcinolone (KENALOG) 0.1 % ointment     Sig: Apply topically to affected areas twice daily as needed. Dispense:  30 g     Refill:  3    ALPRAZolam (XANAX) 0.25 MG tablet     Sig: Take 1/2 tab by mouth once daily and then 1 tablet nightly for anxiety     Dispense:  45 tablet     Refill:  0       Patient given educational materials - see patient instructions. Discussed use, benefit, and side effects of prescribed medications. All patient questions answered. Pt voiced understanding. Reviewed health maintenance.             Electronically signed by Nathan Fortune DO, DO on 10/9/2023 at 11:26 PM

## 2023-11-25 DIAGNOSIS — I10 ESSENTIAL HYPERTENSION: ICD-10-CM

## 2023-11-27 DIAGNOSIS — F41.9 ANXIETY DISORDER, UNSPECIFIED TYPE: ICD-10-CM

## 2023-11-27 NOTE — TELEPHONE ENCOUNTER
Letty Jacob called requesting a refill of the below medication which has been pended for you:     Requested Prescriptions     Pending Prescriptions Disp Refills    carvedilol (COREG) 3.125 MG tablet [Pharmacy Med Name: CARVEDILOL 3.125 MG TABLET] 180 tablet 3     Sig: take 1 tablet by mouth twice a day       Last Appointment Date: 9/27/2023  Next Appointment Date: 3/27/2024    Allergies   Allergen Reactions    Asa [Aspirin]      Makes ears ring    Hctz [Hydrochlorothiazide] Hives    Imdur [Isosorbide Nitrate] Diarrhea and Other (See Comments)     Headaches and explosive diarrhea    Lisinopril      intolerant    Sulfa Antibiotics      procardia

## 2023-11-27 NOTE — TELEPHONE ENCOUNTER
Per [facility], last fill 9/27, quantity 45 for 30 days.      Pratik Majano called requesting a refill of the below medication which has been pended for you:     Requested Prescriptions     Pending Prescriptions Disp Refills    ALPRAZolam (XANAX) 0.25 MG tablet 45 tablet 0     Sig: Take 1/2 tab by mouth once daily and then 1 tablet nightly for anxiety       Last Appointment Date: 9/27/2023  Next Appointment Date: 3/27/2024    Allergies   Allergen Reactions    Asa [Aspirin]      Makes ears ring    Hctz [Hydrochlorothiazide] Hives    Imdur [Isosorbide Nitrate] Diarrhea and Other (See Comments)     Headaches and explosive diarrhea    Lisinopril      intolerant    Sulfa Antibiotics      procardia

## 2023-11-28 DIAGNOSIS — F41.9 ANXIETY DISORDER, UNSPECIFIED TYPE: ICD-10-CM

## 2023-11-28 RX ORDER — ALPRAZOLAM 0.25 MG/1
TABLET ORAL
Qty: 45 TABLET | OUTPATIENT
Start: 2023-11-28

## 2023-11-29 RX ORDER — ALPRAZOLAM 0.25 MG/1
TABLET ORAL
Qty: 45 TABLET | Refills: 0 | Status: SHIPPED | OUTPATIENT
Start: 2023-11-29 | End: 2023-12-25

## 2023-11-29 RX ORDER — CARVEDILOL 3.12 MG/1
3.12 TABLET ORAL 2 TIMES DAILY
Qty: 180 TABLET | Refills: 3 | Status: SHIPPED | OUTPATIENT
Start: 2023-11-29

## 2023-11-29 NOTE — TELEPHONE ENCOUNTER
Controlled Substance Monitoring:    Acute and Chronic Pain Monitoring:   RX Monitoring Periodic Controlled Substance Monitoring   11/29/2023  10:27 AM No signs of potential drug abuse or diversion identified.

## 2024-01-23 DIAGNOSIS — F41.9 ANXIETY DISORDER, UNSPECIFIED TYPE: ICD-10-CM

## 2024-01-23 NOTE — TELEPHONE ENCOUNTER
Per [facility], last fill 11/29/23, quantity 45 for 30 days.     Maeve called requesting a refill of the below medication which has been pended for you:     Requested Prescriptions     Pending Prescriptions Disp Refills    ALPRAZolam (XANAX) 0.25 MG tablet [Pharmacy Med Name: ALPRAZOLAM 0.25 MG TABLET] 45 tablet      Sig: take 1/2 tablet by mouth once daily and then 1 tablet nightly for anxiety       Last Appointment Date: 9/27/2023  Next Appointment Date: 3/27/2024    Allergies   Allergen Reactions    Asa [Aspirin]      Makes ears ring    Hctz [Hydrochlorothiazide] Hives    Imdur [Isosorbide Nitrate] Diarrhea and Other (See Comments)     Headaches and explosive diarrhea    Lisinopril      intolerant    Sulfa Antibiotics      procardia

## 2024-01-24 RX ORDER — ALPRAZOLAM 0.25 MG/1
TABLET ORAL
Qty: 45 TABLET | Refills: 0 | Status: SHIPPED | OUTPATIENT
Start: 2024-01-24 | End: 2024-02-23

## 2024-01-24 NOTE — TELEPHONE ENCOUNTER
Controlled Substance Monitoring:    Acute and Chronic Pain Monitoring:   RX Monitoring Periodic Controlled Substance Monitoring   1/24/2024   9:29 AM No signs of potential drug abuse or diversion identified.

## 2024-03-27 DIAGNOSIS — J84.10 PULMONARY FIBROSIS (HCC): ICD-10-CM

## 2024-03-27 DIAGNOSIS — F41.9 ANXIETY DISORDER, UNSPECIFIED TYPE: ICD-10-CM

## 2024-03-27 RX ORDER — ALPRAZOLAM 0.25 MG/1
TABLET ORAL
Qty: 45 TABLET | Refills: 0 | Status: SHIPPED | OUTPATIENT
Start: 2024-03-27 | End: 2024-04-29

## 2024-03-27 RX ORDER — ALBUTEROL SULFATE 90 UG/1
AEROSOL, METERED RESPIRATORY (INHALATION)
Qty: 18 G | Refills: 0 | Status: SHIPPED | OUTPATIENT
Start: 2024-03-27

## 2024-03-27 NOTE — TELEPHONE ENCOUNTER
[facility] OUT OF THE OFFICE UNTIL MONDAY.    Per [facility], last fill 1/24, quantity 45 for 30 days.     Maeve called requesting a refill of the below medication which has been pended for you:     Requested Prescriptions     Pending Prescriptions Disp Refills    albuterol sulfate HFA (PROVENTIL;VENTOLIN;PROAIR) 108 (90 Base) MCG/ACT inhaler [Pharmacy Med Name: ALBUTEROL HFA 90 MCG INHALER] 18 g 3     Sig: inhale 1 TO 2 puffs every 6 hours if needed for wheezing or shortness of breath    ALPRAZolam (XANAX) 0.25 MG tablet [Pharmacy Med Name: ALPRAZOLAM 0.25 MG TABLET] 45 tablet      Sig: take 1/2 tablet by mouth once daily then take 1 tablet nightly for anxiety       Last Appointment Date: 9/27/2023  Next Appointment Date: 4/3/2024    Allergies   Allergen Reactions    Asa [Aspirin]      Makes ears ring    Hctz [Hydrochlorothiazide] Hives    Imdur [Isosorbide Nitrate] Diarrhea and Other (See Comments)     Headaches and explosive diarrhea    Lisinopril      intolerant    Sulfa Antibiotics      procardia

## 2024-04-03 ENCOUNTER — OFFICE VISIT (OUTPATIENT)
Dept: FAMILY MEDICINE CLINIC | Age: 89
End: 2024-04-03
Payer: MEDICARE

## 2024-04-03 VITALS
DIASTOLIC BLOOD PRESSURE: 58 MMHG | SYSTOLIC BLOOD PRESSURE: 110 MMHG | HEART RATE: 78 BPM | OXYGEN SATURATION: 97 % | HEIGHT: 66 IN | BODY MASS INDEX: 20.17 KG/M2 | RESPIRATION RATE: 24 BRPM | WEIGHT: 125.5 LBS | TEMPERATURE: 97.3 F

## 2024-04-03 DIAGNOSIS — J84.112 IPF (IDIOPATHIC PULMONARY FIBROSIS) (HCC): ICD-10-CM

## 2024-04-03 DIAGNOSIS — Z13.29 SCREENING FOR THYROID DISORDER: ICD-10-CM

## 2024-04-03 DIAGNOSIS — R73.01 ELEVATED FASTING GLUCOSE: ICD-10-CM

## 2024-04-03 DIAGNOSIS — F41.9 ANXIETY DISORDER, UNSPECIFIED TYPE: ICD-10-CM

## 2024-04-03 DIAGNOSIS — Z13.220 SCREENING FOR LIPID DISORDERS: ICD-10-CM

## 2024-04-03 DIAGNOSIS — I10 ESSENTIAL HYPERTENSION: Primary | ICD-10-CM

## 2024-04-03 PROCEDURE — 1036F TOBACCO NON-USER: CPT | Performed by: FAMILY MEDICINE

## 2024-04-03 PROCEDURE — 1123F ACP DISCUSS/DSCN MKR DOCD: CPT | Performed by: FAMILY MEDICINE

## 2024-04-03 PROCEDURE — G8427 DOCREV CUR MEDS BY ELIG CLIN: HCPCS | Performed by: FAMILY MEDICINE

## 2024-04-03 PROCEDURE — 99214 OFFICE O/P EST MOD 30 MIN: CPT | Performed by: FAMILY MEDICINE

## 2024-04-03 PROCEDURE — G8420 CALC BMI NORM PARAMETERS: HCPCS | Performed by: FAMILY MEDICINE

## 2024-04-03 PROCEDURE — 1090F PRES/ABSN URINE INCON ASSESS: CPT | Performed by: FAMILY MEDICINE

## 2024-04-03 SDOH — ECONOMIC STABILITY: FOOD INSECURITY: WITHIN THE PAST 12 MONTHS, YOU WORRIED THAT YOUR FOOD WOULD RUN OUT BEFORE YOU GOT MONEY TO BUY MORE.: NEVER TRUE

## 2024-04-03 SDOH — ECONOMIC STABILITY: INCOME INSECURITY: HOW HARD IS IT FOR YOU TO PAY FOR THE VERY BASICS LIKE FOOD, HOUSING, MEDICAL CARE, AND HEATING?: NOT HARD AT ALL

## 2024-04-03 SDOH — ECONOMIC STABILITY: FOOD INSECURITY: WITHIN THE PAST 12 MONTHS, THE FOOD YOU BOUGHT JUST DIDN'T LAST AND YOU DIDN'T HAVE MONEY TO GET MORE.: NEVER TRUE

## 2024-04-03 ASSESSMENT — PATIENT HEALTH QUESTIONNAIRE - PHQ9
SUM OF ALL RESPONSES TO PHQ QUESTIONS 1-9: 0
2. FEELING DOWN, DEPRESSED OR HOPELESS: NOT AT ALL
SUM OF ALL RESPONSES TO PHQ9 QUESTIONS 1 & 2: 0
SUM OF ALL RESPONSES TO PHQ QUESTIONS 1-9: 0
SUM OF ALL RESPONSES TO PHQ QUESTIONS 1-9: 0
1. LITTLE INTEREST OR PLEASURE IN DOING THINGS: NOT AT ALL
SUM OF ALL RESPONSES TO PHQ QUESTIONS 1-9: 0

## 2024-04-03 NOTE — PROGRESS NOTES
instructions.  Discussed use, benefit, and side effects of prescribed medications.  All patient questions answered.  Pt voiced understanding.  Reviewed health maintenance.            Electronically signed by Keya Nix DO, DO on 4/10/2024 at 2:40 PM

## 2024-04-10 ASSESSMENT — ENCOUNTER SYMPTOMS: SHORTNESS OF BREATH: 1

## 2024-05-01 ENCOUNTER — OFFICE VISIT (OUTPATIENT)
Dept: PULMONOLOGY | Age: 89
End: 2024-05-01
Payer: MEDICARE

## 2024-05-01 VITALS
TEMPERATURE: 97.3 F | WEIGHT: 125 LBS | OXYGEN SATURATION: 98 % | BODY MASS INDEX: 20.09 KG/M2 | HEIGHT: 66 IN | SYSTOLIC BLOOD PRESSURE: 126 MMHG | DIASTOLIC BLOOD PRESSURE: 64 MMHG | HEART RATE: 82 BPM

## 2024-05-01 DIAGNOSIS — R06.00 DYSPNEA, UNSPECIFIED TYPE: ICD-10-CM

## 2024-05-01 DIAGNOSIS — I50.32 CHRONIC DIASTOLIC HEART FAILURE (HCC): ICD-10-CM

## 2024-05-01 DIAGNOSIS — E88.01 HETEROZYGOUS ALPHA 1-ANTITRYPSIN DEFICIENCY (HCC): ICD-10-CM

## 2024-05-01 DIAGNOSIS — J84.112 IPF (IDIOPATHIC PULMONARY FIBROSIS) (HCC): Primary | ICD-10-CM

## 2024-05-01 PROCEDURE — 1036F TOBACCO NON-USER: CPT | Performed by: INTERNAL MEDICINE

## 2024-05-01 PROCEDURE — 99213 OFFICE O/P EST LOW 20 MIN: CPT | Performed by: INTERNAL MEDICINE

## 2024-05-01 PROCEDURE — 1123F ACP DISCUSS/DSCN MKR DOCD: CPT | Performed by: INTERNAL MEDICINE

## 2024-05-01 PROCEDURE — G8427 DOCREV CUR MEDS BY ELIG CLIN: HCPCS | Performed by: INTERNAL MEDICINE

## 2024-05-01 PROCEDURE — 1090F PRES/ABSN URINE INCON ASSESS: CPT | Performed by: INTERNAL MEDICINE

## 2024-05-01 PROCEDURE — G8420 CALC BMI NORM PARAMETERS: HCPCS | Performed by: INTERNAL MEDICINE

## 2024-05-01 ASSESSMENT — ENCOUNTER SYMPTOMS
GASTROINTESTINAL NEGATIVE: 1
SHORTNESS OF BREATH: 1
COUGH: 1
EYES NEGATIVE: 1

## 2024-05-01 NOTE — PROGRESS NOTES
Subjective:      Patient ID: Maeve Gonzalez is a 89 y.o. 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  Patient is accompanied by her son.  Since her last visit in June her symptoms are somewhat worse.  6-minute walk test did not qualify her for oxygen.  She has a small aerosol device that reportedly gives her a puff of oxygen.  States that she feels somewhat better.  Short of breath at rest and with minimal exertion.  High-resolution CT scan of the chest done last summer is reviewed.  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  No mass lesions are identified.  She also has thoracic cage abnormality with pectus deformity.    Patient apparently will be moving to an assisted living facility.  I had a long discussion with her and her son with regards to her disease, limited treatment options, and natural history.  I advised them that unfortunately this disease is relentlessly progressive.  I encouraged consideration of hospice/palliative care in the context of her anticipated housing.  Patient's son is DPOA and patient has a living will.  She does not wish extraordinary care at end-of-life    Review of Systems   Constitutional: Negative.    HENT: Negative.     Eyes: Negative.    Respiratory:  Positive for cough and shortness of breath.    Cardiovascular: Negative.    Gastrointestinal: Negative.    All other systems reviewed and are negative.      Objective:     Vitals:    05/01/24 1440   BP: 126/64   Pulse: 82   Temp: 97.3 °F (36.3 °C)   SpO2: 98%   Weight: 56.7 kg (125 lb)   Height: 1.676 m (5' 6\")     Current Outpatient Medications   Medication Sig Dispense Refill    Multiple Vitamin (MULTIVITAMIN ADULT PO) Take by mouth      albuterol sulfate HFA (PROVENTIL;VENTOLIN;PROAIR) 108 (90 Base) MCG/ACT inhaler inhale 1 TO 2 puffs every 6 hours if needed for wheezing or shortness

## 2024-05-03 ENCOUNTER — TELEPHONE (OUTPATIENT)
Dept: FAMILY MEDICINE CLINIC | Age: 89
End: 2024-05-03

## 2024-05-03 NOTE — TELEPHONE ENCOUNTER
[facility] and medication list. Patient is being admitted to [facility]. Please fax to [phone].

## 2024-05-17 ENCOUNTER — TELEPHONE (OUTPATIENT)
Dept: FAMILY MEDICINE CLINIC | Age: 89
End: 2024-05-17

## 2024-05-17 DIAGNOSIS — F41.9 ANXIETY DISORDER, UNSPECIFIED TYPE: ICD-10-CM

## 2024-05-17 RX ORDER — ALPRAZOLAM 0.25 MG/1
TABLET ORAL
Qty: 45 TABLET | Refills: 0 | Status: SHIPPED | OUTPATIENT
Start: 2024-05-17 | End: 2024-06-19

## 2024-05-17 NOTE — TELEPHONE ENCOUNTER
Controlled Substance Monitoring:    Acute and Chronic Pain Monitoring:   RX Monitoring Periodic Controlled Substance Monitoring Chronic Pain > 80 MEDD   5/17/2024   4:24 PM No signs of potential drug abuse or diversion identified. Obtained or confirmed \"Medication Contract\" on file.

## 2024-05-17 NOTE — TELEPHONE ENCOUNTER
[facility] NOT WORKING     Maeve called requesting a refill of the below medication which has been pended for you:     Requested Prescriptions     Pending Prescriptions Disp Refills    ALPRAZolam (XANAX) 0.25 MG tablet 45 tablet 0     Sig: take 1/2 tablet by mouth once daily then take 1 tablet nightly for anxiety       Last Appointment Date: 4/3/2024  Next Appointment Date: 10/4/2024    Allergies   Allergen Reactions    Asa [Aspirin]      Makes ears ring    Hctz [Hydrochlorothiazide] Hives    Imdur [Isosorbide Nitrate] Diarrhea and Other (See Comments)     Headaches and explosive diarrhea    Lisinopril      intolerant    Sulfa Antibiotics      procardia